# Patient Record
Sex: MALE | Race: WHITE | Employment: OTHER | URBAN - METROPOLITAN AREA
[De-identification: names, ages, dates, MRNs, and addresses within clinical notes are randomized per-mention and may not be internally consistent; named-entity substitution may affect disease eponyms.]

---

## 2017-05-18 RX ORDER — ALBUTEROL SULFATE 90 UG/1
2 AEROSOL, METERED RESPIRATORY (INHALATION) EVERY 6 HOURS PRN
Qty: 1 INHALER | Refills: 1 | Status: SHIPPED | OUTPATIENT
Start: 2017-05-18 | End: 2017-08-29 | Stop reason: SDUPTHER

## 2017-08-30 RX ORDER — ATORVASTATIN CALCIUM 10 MG/1
10 TABLET, FILM COATED ORAL DAILY
Qty: 30 TABLET | Refills: 5 | Status: SHIPPED | OUTPATIENT
Start: 2017-08-30 | End: 2017-09-05

## 2017-08-30 RX ORDER — LOSARTAN POTASSIUM AND HYDROCHLOROTHIAZIDE 12.5; 5 MG/1; MG/1
1 TABLET ORAL DAILY
Qty: 30 TABLET | Refills: 5 | Status: SHIPPED | OUTPATIENT
Start: 2017-08-30 | End: 2017-09-05 | Stop reason: DRUGHIGH

## 2017-08-30 RX ORDER — ALBUTEROL SULFATE 90 UG/1
2 AEROSOL, METERED RESPIRATORY (INHALATION) EVERY 6 HOURS PRN
Qty: 1 INHALER | Refills: 1 | Status: SHIPPED | OUTPATIENT
Start: 2017-08-30 | End: 2017-11-20 | Stop reason: SDUPTHER

## 2017-09-05 ENCOUNTER — OFFICE VISIT (OUTPATIENT)
Dept: FAMILY MEDICINE CLINIC | Age: 42
End: 2017-09-05
Payer: COMMERCIAL

## 2017-09-05 VITALS
RESPIRATION RATE: 16 BRPM | TEMPERATURE: 98.8 F | DIASTOLIC BLOOD PRESSURE: 82 MMHG | WEIGHT: 262 LBS | HEIGHT: 67 IN | HEART RATE: 82 BPM | SYSTOLIC BLOOD PRESSURE: 134 MMHG | BODY MASS INDEX: 41.12 KG/M2

## 2017-09-05 DIAGNOSIS — E78.2 MIXED HYPERLIPIDEMIA: ICD-10-CM

## 2017-09-05 DIAGNOSIS — E88.81 METABOLIC SYNDROME: ICD-10-CM

## 2017-09-05 DIAGNOSIS — Z00.00 ROUTINE HEALTH MAINTENANCE: ICD-10-CM

## 2017-09-05 DIAGNOSIS — Z72.0 TOBACCO USE: ICD-10-CM

## 2017-09-05 DIAGNOSIS — R73.03 PRE-DIABETES: ICD-10-CM

## 2017-09-05 DIAGNOSIS — I10 ESSENTIAL HYPERTENSION: Primary | ICD-10-CM

## 2017-09-05 PROCEDURE — 99214 OFFICE O/P EST MOD 30 MIN: CPT | Performed by: NURSE PRACTITIONER

## 2017-09-05 RX ORDER — LOSARTAN POTASSIUM AND HYDROCHLOROTHIAZIDE 12.5; 1 MG/1; MG/1
1 TABLET ORAL DAILY
Qty: 30 TABLET | Refills: 3 | Status: SHIPPED | OUTPATIENT
Start: 2017-09-05 | End: 2019-08-28

## 2017-09-05 ASSESSMENT — ENCOUNTER SYMPTOMS
SHORTNESS OF BREATH: 0
BLURRED VISION: 0

## 2017-09-13 ASSESSMENT — ENCOUNTER SYMPTOMS
ABDOMINAL DISTENTION: 0
WHEEZING: 0
ABDOMINAL PAIN: 0
RHINORRHEA: 0
EYE PAIN: 0
BLOOD IN STOOL: 0
CONSTIPATION: 0
NAUSEA: 0
PHOTOPHOBIA: 0
CHEST TIGHTNESS: 0
SORE THROAT: 0
VOMITING: 0
COUGH: 0
DIARRHEA: 0
BACK PAIN: 0

## 2017-11-20 RX ORDER — ALBUTEROL SULFATE 90 UG/1
2 AEROSOL, METERED RESPIRATORY (INHALATION) EVERY 6 HOURS PRN
Qty: 18 G | Refills: 0 | Status: SHIPPED | OUTPATIENT
Start: 2017-11-20 | End: 2017-12-29 | Stop reason: SDUPTHER

## 2017-11-20 NOTE — TELEPHONE ENCOUNTER
LOV: 09/05/17  LR: 08/30/17    Health Maintenance   Topic Date Due    HIV screen  11/08/1989    Pneumococcal med risk (1 of 1 - PPSV23) 11/08/1993    Flu vaccine (1) 09/01/2017    Diabetes screen  05/02/2019    Lipid screen  05/02/2021    DTaP/Tdap/Td vaccine (2 - Td) 02/13/2022             (applicable per patient's age: Cancer Screenings, Depression Screening, Fall Risk Screening, Immunizations)    Hemoglobin A1C (%)   Date Value   05/02/2016 5.6     LDL Cholesterol (mg/dL)   Date Value   05/02/2016 141 (H)     AST (U/L)   Date Value   05/02/2016 14     ALT (U/L)   Date Value   05/02/2016 21     BUN (mg/dL)   Date Value   05/02/2016 12      (goal A1C is < 7)   (goal LDL is <100) need 30-50% reduction from baseline     BP Readings from Last 3 Encounters:   09/05/17 134/82   08/01/16 136/84   05/02/16 128/82    (goal /80)      All Future Testing planned in [facility]:  Lab Frequency Next Occurrence   Lipid Panel Once 09/11/2017   Comprehensive Metabolic Panel Once 05/99/6521   CBC Once 09/11/2017   Hemoglobin A1C Once 09/11/2017   Insulin, total Once 09/11/2017       Next Visit Date:  No future appointments.          Patient Active Problem List:     Asthma     Allergic rhinitis     Hypertension     Hyperlipidemia     Metabolic syndrome     Pre-diabetes     Tobacco use

## 2017-12-29 RX ORDER — ALBUTEROL SULFATE 90 UG/1
2 AEROSOL, METERED RESPIRATORY (INHALATION) EVERY 6 HOURS PRN
Qty: 1 INHALER | Refills: 0 | Status: SHIPPED | OUTPATIENT
Start: 2017-12-29 | End: 2019-08-28

## 2017-12-29 NOTE — TELEPHONE ENCOUNTER
LOV 9-5-17  Patients wife states patient or she will call back to schedule as they do not know his schedule at this time. LRF 11-20-17  Health Maintenance   Topic Date Due    HIV screen  11/08/1989    Pneumococcal med risk (1 of 1 - PPSV23) 11/08/1993    Flu vaccine (1) 09/01/2017    Diabetes screen  05/02/2019    Lipid screen  05/02/2021    DTaP/Tdap/Td vaccine (2 - Td) 02/13/2022             (applicable per patient's age: Cancer Screenings, Depression Screening, Fall Risk Screening, Immunizations)    Hemoglobin A1C (%)   Date Value   05/02/2016 5.6     LDL Cholesterol (mg/dL)   Date Value   05/02/2016 141 (H)     AST (U/L)   Date Value   05/02/2016 14     ALT (U/L)   Date Value   05/02/2016 21     BUN (mg/dL)   Date Value   05/02/2016 12      (goal A1C is < 7)   (goal LDL is <100) need 30-50% reduction from baseline     BP Readings from Last 3 Encounters:   09/05/17 134/82   08/01/16 136/84   05/02/16 128/82    (goal /80)      All Future Testing planned in [facility]:  Lab Frequency Next Occurrence   Lipid Panel Once 09/11/2017   Comprehensive Metabolic Panel Once 40/34/4059   CBC Once 09/11/2017   Hemoglobin A1C Once 09/11/2017   Insulin, total Once 09/11/2017       Next Visit Date:  No future appointments.          Patient Active Problem List:     Asthma     Allergic rhinitis     Hypertension     Hyperlipidemia     Metabolic syndrome     Pre-diabetes     Tobacco use

## 2017-12-29 NOTE — TELEPHONE ENCOUNTER
This was just filled last month-does he need a refill? It may be a good idea to discuss a controller medication if he is frequently using his albuterol.

## 2019-08-28 ENCOUNTER — OFFICE VISIT (OUTPATIENT)
Dept: FAMILY MEDICINE CLINIC | Age: 44
End: 2019-08-28
Payer: COMMERCIAL

## 2019-08-28 VITALS
RESPIRATION RATE: 20 BRPM | BODY MASS INDEX: 40.41 KG/M2 | HEART RATE: 86 BPM | DIASTOLIC BLOOD PRESSURE: 90 MMHG | TEMPERATURE: 98 F | SYSTOLIC BLOOD PRESSURE: 158 MMHG | WEIGHT: 258 LBS

## 2019-08-28 DIAGNOSIS — E78.5 HYPERLIPIDEMIA, UNSPECIFIED HYPERLIPIDEMIA TYPE: ICD-10-CM

## 2019-08-28 DIAGNOSIS — R73.01 IMPAIRED FASTING GLUCOSE: ICD-10-CM

## 2019-08-28 DIAGNOSIS — R09.81 NASAL CONGESTION: ICD-10-CM

## 2019-08-28 DIAGNOSIS — I10 ESSENTIAL HYPERTENSION: Primary | ICD-10-CM

## 2019-08-28 DIAGNOSIS — J30.2 SEASONAL ALLERGIES: ICD-10-CM

## 2019-08-28 DIAGNOSIS — J33.9 NASAL POLYPS: ICD-10-CM

## 2019-08-28 DIAGNOSIS — Z72.0 TOBACCO USE: ICD-10-CM

## 2019-08-28 PROCEDURE — 99214 OFFICE O/P EST MOD 30 MIN: CPT | Performed by: PEDIATRICS

## 2019-08-28 RX ORDER — CARVEDILOL PHOSPHATE 40 MG/1
40 CAPSULE, EXTENDED RELEASE ORAL
Qty: 30 CAPSULE | Refills: 3 | Status: SHIPPED | OUTPATIENT
Start: 2019-08-28 | End: 2019-11-20

## 2019-08-28 RX ORDER — AZELASTINE 1 MG/ML
2 SPRAY, METERED NASAL 2 TIMES DAILY
Qty: 4 BOTTLE | Refills: 1 | Status: SHIPPED | OUTPATIENT
Start: 2019-08-28 | End: 2019-11-06

## 2019-08-28 ASSESSMENT — ENCOUNTER SYMPTOMS
COLOR CHANGE: 0
ABDOMINAL PAIN: 0
SHORTNESS OF BREATH: 0
BLURRED VISION: 0
BLOOD IN STOOL: 0
VOMITING: 0
DIARRHEA: 0
RHINORRHEA: 1
PHOTOPHOBIA: 0
EYE REDNESS: 0
EYE DISCHARGE: 0
ORTHOPNEA: 0
EYE PAIN: 0
VOICE CHANGE: 0
STRIDOR: 0
COUGH: 0
WHEEZING: 0
CHEST TIGHTNESS: 0
CONSTIPATION: 0
SORE THROAT: 0

## 2019-08-28 ASSESSMENT — PATIENT HEALTH QUESTIONNAIRE - PHQ9
1. LITTLE INTEREST OR PLEASURE IN DOING THINGS: 0
SUM OF ALL RESPONSES TO PHQ QUESTIONS 1-9: 0
2. FEELING DOWN, DEPRESSED OR HOPELESS: 0
SUM OF ALL RESPONSES TO PHQ QUESTIONS 1-9: 0
SUM OF ALL RESPONSES TO PHQ9 QUESTIONS 1 & 2: 0

## 2019-08-28 NOTE — PROGRESS NOTES
improvement. Compliance problems include exercise. Review of Systems   Constitutional: Negative for appetite change, fatigue, fever and malaise/fatigue. HENT: Positive for congestion, postnasal drip and rhinorrhea. Negative for sore throat, tinnitus and voice change. Anosmia x several months   Eyes: Negative for blurred vision, photophobia, pain, discharge, redness and visual disturbance. Respiratory: Negative for cough, chest tightness, shortness of breath, wheezing and stridor. Cardiovascular: Negative for chest pain, palpitations, orthopnea, leg swelling and PND. Gastrointestinal: Negative for abdominal pain, blood in stool, constipation, diarrhea and vomiting. Endocrine: Negative for polydipsia, polyphagia and polyuria. Genitourinary: Negative for difficulty urinating, frequency, hematuria and urgency. Musculoskeletal: Negative for neck pain. Skin: Negative for color change and rash. Allergic/Immunologic: Negative for immunocompromised state. Neurological: Negative for dizziness, tremors, weakness, light-headedness, numbness and headaches. Hematological: Negative for adenopathy. Does not bruise/bleed easily. Objective:     BP (!) 158/90 (Site: Right Upper Arm, Position: Sitting, Cuff Size: Medium Adult)   Pulse 86   Temp 98 °F (36.7 °C) (Tympanic)   Resp 20   Wt 258 lb (117 kg)   BMI 40.41 kg/m²        Physical Exam   Constitutional: He is oriented to person, place, and time. He appears well-developed and well-nourished. No distress. obese   HENT:   Head: Normocephalic. Right Ear: External ear normal. No middle ear effusion. Left Ear: External ear normal.  No middle ear effusion. Nose: Mucosal edema present. Right sinus exhibits no maxillary sinus tenderness and no frontal sinus tenderness. Left sinus exhibits no maxillary sinus tenderness and no frontal sinus tenderness.    Mouth/Throat: Uvula is midline and mucous membranes are normal. Posterior oropharyngeal erythema (mild with cobblestonning) present. No oropharyngeal exudate or posterior oropharyngeal edema. No tonsillar exudate. Pale and boggy mucosa with bilateral nasal polyps   Eyes: Pupils are equal, round, and reactive to light. Conjunctivae and EOM are normal. Right eye exhibits no discharge. Left eye exhibits no discharge. No scleral icterus. Neck: Normal range of motion. Neck supple. No JVD present. Cardiovascular: Normal rate, regular rhythm and normal heart sounds. No murmur heard. Pulmonary/Chest: Effort normal and breath sounds normal. No respiratory distress. He has no wheezes. He has no rales. Musculoskeletal: He exhibits no edema. Lymphadenopathy:     He has no cervical adenopathy. Neurological: He is alert and oriented to person, place, and time. Skin: Skin is warm. No rash noted. He is not diaphoretic. Psychiatric: He has a normal mood and affect. His speech is normal and behavior is normal. Judgment and thought content normal. His mood appears not anxious. Cognition and memory are normal. He does not exhibit a depressed mood. Nursing note and vitals reviewed. Assessment:      Diagnosis Orders   1. Essential hypertension     2. Hyperlipidemia, unspecified hyperlipidemia type  Lipid Panel    Comprehensive Metabolic Panel    CBC   3. Impaired fasting glucose  Hemoglobin A1C   4. Seasonal allergies  azelastine (ASTELIN) 0.1 % nasal spray    XR SINUSES (MIN 3 VIEWS )    External Referral To ENT   5. Nasal congestion  azelastine (ASTELIN) 0.1 % nasal spray    XR SINUSES (MIN 3 VIEWS )    External Referral To ENT   6. Nasal polyps  External Referral To ENT   7.  Tobacco use         Plan:     Obtain labs as ordered   Start coreg XR 40mg once daily   Discontinue any decongestants  Limit coffee intake and any other caffeine intake  Monitor blood pressure 1-2 times daily strict low-salt, low-cholesterol, low-carb diet  Regular exercise  Weight reduction encouraged  Claritin 10mg once daily   Astelin as prescribed   Obtain sinus xray   ENT consult for evaluation   Smoking cessation encouraged  Call with concerns      Lacy Lopez received counseling on the following healthy behaviors: nutrition, exercise, medication adherence and tobacco cessation  Reviewed prior labs and health maintenance  Continue current medications, diet and exercise. Discussed use, benefit, and side effects of prescribed medications. Barriers to medication compliance addressed. Patient given educational materials - see patient instructions  Was a self-tracking handout given in paper form or via [employer]? No    Requested Prescriptions     Signed Prescriptions Disp Refills    azelastine (ASTELIN) 0.1 % nasal spray 4 Bottle 1     Si sprays by Nasal route 2 times daily Use in each nostril as directed    carvedilol (COREG CR) 40 MG CP24 extended release capsule 30 capsule 3     Sig: Take 1 capsule by mouth daily (with breakfast)       All patient questions answered. Patient voiced understanding. Quality Measures    Body mass index is 40.41 kg/m². Elevated. Weight control planned discussed Healthy diet and regular exercise. BP: (!) 158/90 Blood pressure is high. Treatment plan consists of Weight Reduction, Dietary Sodium Restriction, Increased Physical Activity, Avoid Tobacco and Second-hand Smoke and Antihypertensive Medication Started.     Lab Results   Component Value Date    LDLCHOLESTEROL 141 (H) 2016    (goal LDL reduction with dx if diabetes is 50% LDL reduction)      PHQ Scores 2019   PHQ2 Score 0 0 0   PHQ9 Score 0 0 0     Interpretation of Total Score Depression Severity: 1-4 = Minimal depression, 5-9 = Mild depression, 10-14 = Moderate depression, 15-19 = Moderately severe depression, 20-27 = Severe depression        Electronically signed by Fariba Rainey MD on 2019 at 12:24 PM

## 2019-09-23 ENCOUNTER — HOSPITAL ENCOUNTER (OUTPATIENT)
Age: 44
Setting detail: SPECIMEN
Discharge: HOME | End: 2019-09-23
Payer: COMMERCIAL

## 2019-09-23 DIAGNOSIS — R73.01 IMPAIRED FASTING GLUCOSE: ICD-10-CM

## 2019-09-23 DIAGNOSIS — E78.5 HYPERLIPIDEMIA, UNSPECIFIED HYPERLIPIDEMIA TYPE: ICD-10-CM

## 2019-09-23 LAB
ALBUMIN SERPL-MCNC: 4.3 G/DL (ref 3.5–5.2)
ALBUMIN/GLOBULIN RATIO: 1.6 (ref 1–2.5)
ALP BLD-CCNC: 86 U/L (ref 40–129)
ALT SERPL-CCNC: 12 U/L (ref 5–41)
ANION GAP SERPL CALCULATED.3IONS-SCNC: 12 MMOL/L (ref 9–17)
AST SERPL-CCNC: 13 U/L
BILIRUB SERPL-MCNC: 0.35 MG/DL (ref 0.3–1.2)
BUN BLDV-MCNC: 11 MG/DL (ref 6–20)
BUN/CREAT BLD: ABNORMAL (ref 9–20)
CALCIUM SERPL-MCNC: 9 MG/DL (ref 8.6–10.4)
CHLORIDE BLD-SCNC: 101 MMOL/L (ref 98–107)
CHOLESTEROL/HDL RATIO: 4.8
CHOLESTEROL: 189 MG/DL
CO2: 27 MMOL/L (ref 20–31)
CREAT SERPL-MCNC: 0.87 MG/DL (ref 0.7–1.2)
ESTIMATED AVERAGE GLUCOSE: 117 MG/DL
GFR AFRICAN AMERICAN: >60 ML/MIN
GFR NON-AFRICAN AMERICAN: >60 ML/MIN
GFR SERPL CREATININE-BSD FRML MDRD: ABNORMAL ML/MIN/{1.73_M2}
GFR SERPL CREATININE-BSD FRML MDRD: ABNORMAL ML/MIN/{1.73_M2}
GLUCOSE BLD-MCNC: 114 MG/DL (ref 70–99)
HBA1C MFR BLD: 5.7 % (ref 4–6)
HCT VFR BLD CALC: 48.3 % (ref 40.7–50.3)
HDLC SERPL-MCNC: 39 MG/DL
HEMOGLOBIN: 15.6 G/DL (ref 13–17)
LDL CHOLESTEROL: 119 MG/DL (ref 0–130)
MCH RBC QN AUTO: 29.2 PG (ref 25.2–33.5)
MCHC RBC AUTO-ENTMCNC: 32.3 G/DL (ref 28.4–34.8)
MCV RBC AUTO: 90.3 FL (ref 82.6–102.9)
NRBC AUTOMATED: 0 PER 100 WBC
PDW BLD-RTO: 12.3 % (ref 11.8–14.4)
PLATELET # BLD: 279 K/UL (ref 138–453)
PMV BLD AUTO: 11.2 FL (ref 8.1–13.5)
POTASSIUM SERPL-SCNC: 4.4 MMOL/L (ref 3.7–5.3)
RBC # BLD: 5.35 M/UL (ref 4.21–5.77)
SODIUM BLD-SCNC: 140 MMOL/L (ref 135–144)
TOTAL PROTEIN: 7 G/DL (ref 6.4–8.3)
TRIGL SERPL-MCNC: 156 MG/DL
VLDLC SERPL CALC-MCNC: ABNORMAL MG/DL (ref 1–30)
WBC # BLD: 8.1 K/UL (ref 3.5–11.3)

## 2019-09-24 ENCOUNTER — TELEPHONE (OUTPATIENT)
Dept: FAMILY MEDICINE CLINIC | Age: 44
End: 2019-09-24

## 2019-09-24 DIAGNOSIS — R73.03 PRE-DIABETES: ICD-10-CM

## 2019-09-24 DIAGNOSIS — J01.90 ACUTE SINUSITIS, RECURRENCE NOT SPECIFIED, UNSPECIFIED LOCATION: ICD-10-CM

## 2019-09-24 DIAGNOSIS — J01.90 ACUTE SINUSITIS, RECURRENCE NOT SPECIFIED, UNSPECIFIED LOCATION: Primary | ICD-10-CM

## 2019-09-24 DIAGNOSIS — R73.01 IMPAIRED FASTING GLUCOSE: ICD-10-CM

## 2019-09-24 DIAGNOSIS — E78.2 MIXED HYPERLIPIDEMIA: Primary | ICD-10-CM

## 2019-09-24 RX ORDER — AMOXICILLIN AND CLAVULANATE POTASSIUM 562.5; 437.5; 62.5 MG/1; MG/1; MG/1
1 TABLET, FILM COATED, EXTENDED RELEASE ORAL 2 TIMES DAILY
Qty: 28 TABLET | Refills: 0 | Status: CANCELLED | OUTPATIENT
Start: 2019-09-24 | End: 2019-10-08

## 2019-09-24 RX ORDER — AMOXICILLIN AND CLAVULANATE POTASSIUM 562.5; 437.5; 62.5 MG/1; MG/1; MG/1
1 TABLET, FILM COATED, EXTENDED RELEASE ORAL 2 TIMES DAILY
Qty: 28 TABLET | Refills: 0 | Status: SHIPPED | OUTPATIENT
Start: 2019-09-24 | End: 2019-10-08

## 2019-10-07 DIAGNOSIS — J01.90 ACUTE SINUSITIS, RECURRENCE NOT SPECIFIED, UNSPECIFIED LOCATION: Primary | ICD-10-CM

## 2019-11-06 ENCOUNTER — OFFICE VISIT (OUTPATIENT)
Dept: FAMILY MEDICINE CLINIC | Age: 44
End: 2019-11-06
Payer: COMMERCIAL

## 2019-11-06 ENCOUNTER — HOSPITAL ENCOUNTER (OUTPATIENT)
Age: 44
Setting detail: SPECIMEN
Discharge: HOME | End: 2019-11-06
Payer: COMMERCIAL

## 2019-11-06 VITALS
DIASTOLIC BLOOD PRESSURE: 90 MMHG | BODY MASS INDEX: 40.25 KG/M2 | SYSTOLIC BLOOD PRESSURE: 142 MMHG | TEMPERATURE: 98.6 F | RESPIRATION RATE: 20 BRPM | WEIGHT: 257 LBS

## 2019-11-06 DIAGNOSIS — J02.9 SORE THROAT: ICD-10-CM

## 2019-11-06 DIAGNOSIS — R45.4 IRRITABLE: ICD-10-CM

## 2019-11-06 DIAGNOSIS — J30.2 SEASONAL ALLERGIES: ICD-10-CM

## 2019-11-06 DIAGNOSIS — R09.81 NASAL CONGESTION: ICD-10-CM

## 2019-11-06 DIAGNOSIS — I10 ESSENTIAL HYPERTENSION: Primary | ICD-10-CM

## 2019-11-06 DIAGNOSIS — F06.30 DEPRESSIVE DISORDER DUE TO SEPARATE MEDICAL CONDITION: ICD-10-CM

## 2019-11-06 DIAGNOSIS — F06.4 ANXIETY DISORDER DUE TO MEDICAL CONDITION: ICD-10-CM

## 2019-11-06 DIAGNOSIS — J33.9 NASAL POLYPS: ICD-10-CM

## 2019-11-06 LAB — S PYO AG THROAT QL: NORMAL

## 2019-11-06 PROCEDURE — 99214 OFFICE O/P EST MOD 30 MIN: CPT | Performed by: PEDIATRICS

## 2019-11-06 PROCEDURE — 87880 STREP A ASSAY W/OPTIC: CPT | Performed by: PEDIATRICS

## 2019-11-06 RX ORDER — CARVEDILOL 25 MG/1
25 TABLET ORAL 2 TIMES DAILY
Qty: 60 TABLET | Refills: 5 | Status: SHIPPED | OUTPATIENT
Start: 2019-11-06 | End: 2020-04-13

## 2019-11-06 ASSESSMENT — ENCOUNTER SYMPTOMS
ORTHOPNEA: 0
BLOOD IN STOOL: 0
COLOR CHANGE: 0
CHEST TIGHTNESS: 0
EYE PAIN: 0
BLURRED VISION: 0
DIARRHEA: 0
WHEEZING: 0
SHORTNESS OF BREATH: 0
EYE DISCHARGE: 0
COUGH: 0
EYE REDNESS: 0
VOICE CHANGE: 0
ABDOMINAL PAIN: 0
RHINORRHEA: 1
CONSTIPATION: 0
VOMITING: 0
PHOTOPHOBIA: 0
STRIDOR: 0

## 2019-11-07 LAB
DIRECT EXAM: NORMAL
Lab: NORMAL
SPECIMEN DESCRIPTION: NORMAL

## 2019-11-07 ASSESSMENT — ENCOUNTER SYMPTOMS: SORE THROAT: 1

## 2019-11-20 ENCOUNTER — NURSE ONLY (OUTPATIENT)
Dept: FAMILY MEDICINE CLINIC | Age: 44
End: 2019-11-20

## 2019-11-20 VITALS
DIASTOLIC BLOOD PRESSURE: 86 MMHG | HEART RATE: 90 BPM | TEMPERATURE: 97.9 F | SYSTOLIC BLOOD PRESSURE: 140 MMHG | RESPIRATION RATE: 20 BRPM

## 2019-11-20 DIAGNOSIS — I10 ESSENTIAL HYPERTENSION: Primary | ICD-10-CM

## 2019-11-20 RX ORDER — AMLODIPINE BESYLATE 5 MG/1
5 TABLET ORAL DAILY
Qty: 30 TABLET | Refills: 5 | Status: SHIPPED | OUTPATIENT
Start: 2019-11-20 | End: 2020-05-13

## 2019-12-12 LAB
BUN BLDV-MCNC: 14 MG/DL
CALCIUM SERPL-MCNC: 8.8 MG/DL
CHLORIDE BLD-SCNC: 102 MMOL/L
CO2: 30 MMOL/L
CREAT SERPL-MCNC: 1.04 MG/DL
GFR CALCULATED: 77
GLUCOSE BLD-MCNC: 110 MG/DL
POTASSIUM SERPL-SCNC: 4.2 MMOL/L
SODIUM BLD-SCNC: 139 MMOL/L

## 2020-01-01 RX ORDER — ALBUTEROL SULFATE 90 UG/1
2 AEROSOL, METERED RESPIRATORY (INHALATION) EVERY 4 HOURS PRN
Qty: 1 INHALER | Refills: 1 | Status: SHIPPED | OUTPATIENT
Start: 2020-01-01 | End: 2021-01-01 | Stop reason: SDUPTHER

## 2020-01-16 ENCOUNTER — OFFICE VISIT (OUTPATIENT)
Dept: FAMILY MEDICINE CLINIC | Age: 45
End: 2020-01-16
Payer: COMMERCIAL

## 2020-01-16 VITALS
HEART RATE: 63 BPM | BODY MASS INDEX: 41.65 KG/M2 | RESPIRATION RATE: 20 BRPM | SYSTOLIC BLOOD PRESSURE: 152 MMHG | WEIGHT: 265.9 LBS | TEMPERATURE: 97.8 F | DIASTOLIC BLOOD PRESSURE: 96 MMHG | OXYGEN SATURATION: 96 %

## 2020-01-16 PROCEDURE — 99213 OFFICE O/P EST LOW 20 MIN: CPT | Performed by: NURSE PRACTITIONER

## 2020-01-16 PROCEDURE — 99406 BEHAV CHNG SMOKING 3-10 MIN: CPT | Performed by: NURSE PRACTITIONER

## 2020-01-16 RX ORDER — HYDROCHLOROTHIAZIDE 25 MG/1
25 TABLET ORAL EVERY MORNING
Qty: 90 TABLET | Refills: 1 | Status: SHIPPED | OUTPATIENT
Start: 2020-01-16 | End: 2020-12-02 | Stop reason: SDUPTHER

## 2020-01-16 RX ORDER — AZELASTINE 1 MG/ML
1 SPRAY, METERED NASAL PRN
COMMUNITY
Start: 2020-01-03 | End: 2020-09-25 | Stop reason: SDUPTHER

## 2020-01-16 RX ORDER — FLUTICASONE PROPIONATE 50 MCG
1 SPRAY, SUSPENSION (ML) NASAL PRN
COMMUNITY
Start: 2020-01-03 | End: 2020-09-25

## 2020-01-16 RX ORDER — CETIRIZINE HYDROCHLORIDE 10 MG/1
10 TABLET ORAL DAILY
COMMUNITY
Start: 2020-01-03 | End: 2020-09-25 | Stop reason: SDUPTHER

## 2020-01-16 ASSESSMENT — PATIENT HEALTH QUESTIONNAIRE - PHQ9
6. FEELING BAD ABOUT YOURSELF - OR THAT YOU ARE A FAILURE OR HAVE LET YOURSELF OR YOUR FAMILY DOWN: 0
10. IF YOU CHECKED OFF ANY PROBLEMS, HOW DIFFICULT HAVE THESE PROBLEMS MADE IT FOR YOU TO DO YOUR WORK, TAKE CARE OF THINGS AT HOME, OR GET ALONG WITH OTHER PEOPLE: 1
4. FEELING TIRED OR HAVING LITTLE ENERGY: 1
SUM OF ALL RESPONSES TO PHQ9 QUESTIONS 1 & 2: 4
SUM OF ALL RESPONSES TO PHQ QUESTIONS 1-9: 7
SUM OF ALL RESPONSES TO PHQ QUESTIONS 1-9: 7
8. MOVING OR SPEAKING SO SLOWLY THAT OTHER PEOPLE COULD HAVE NOTICED. OR THE OPPOSITE, BEING SO FIGETY OR RESTLESS THAT YOU HAVE BEEN MOVING AROUND A LOT MORE THAN USUAL: 0
5. POOR APPETITE OR OVEREATING: 1
2. FEELING DOWN, DEPRESSED OR HOPELESS: 1
3. TROUBLE FALLING OR STAYING ASLEEP: 1
9. THOUGHTS THAT YOU WOULD BE BETTER OFF DEAD, OR OF HURTING YOURSELF: 0
1. LITTLE INTEREST OR PLEASURE IN DOING THINGS: 3

## 2020-01-16 ASSESSMENT — ENCOUNTER SYMPTOMS
BLURRED VISION: 0
SORE THROAT: 0
SINUS PAIN: 0
CHEST TIGHTNESS: 0
SINUS PRESSURE: 0
SHORTNESS OF BREATH: 0

## 2020-01-16 NOTE — PROGRESS NOTES
History of Present Illness:     Eli King is a 38 y.o. male who presents in office today with Self follow up on recent condition of high blood pressure. Denies any headaches, dizziness, or chest pain. Around 142/90 checking intermittently. Patient states he has family history of hypertension. Does not miss any medication dosages. He does not feel like current blood pressure medication is working well. He feels like he is struggled to continue medication. Had nasal polyp surgery approx 1 month ago. Hypertension   This is a chronic problem. The problem is unchanged. The problem is uncontrolled. Pertinent negatives include no blurred vision, chest pain, headaches, palpitations or shortness of breath. There are no associated agents to hypertension. Risk factors for coronary artery disease include smoking/tobacco exposure, stress, male gender and obesity. Past treatments include angiotensin blockers and diuretics. The current treatment provides mild improvement. Compliance problems include exercise and diet. Patient Care Team:  Bipin Juarez MD as PCP - General (Pediatrics)  Bipin Juarez MD as PCP - [facility] Empaneled Provider    Visit Information    Have you changed or started any medications since your last visit including any over-the-counter medicines, vitamins, or herbal medicines? yes - Med list updated   Are you having any side effects from any of your medications? -  No  Have you stopped taking any of your medications? Is so, why? -  yes - Med list updated  Have you seen any other physician or provider since your last visit? Yes - Records Obtained  Dr. Alejandro Deutsch allergist, Dr. Daryle Laughter   Have you had any other diagnostic tests since your last visit? Yes - Records Obtained Allergy testing, Blood work   Have you been seen in the emergency room and/or had an admission to a hospital since we last saw you? No  Have you had your routine dental cleaning in the past 6 months?  Yes  Have Physical Exam  Constitutional:       Appearance: Normal appearance. He is obese. HENT:      Head: Normocephalic and atraumatic. Right Ear: External ear normal.      Left Ear: External ear normal.   Neck:      Musculoskeletal: Normal range of motion. Cardiovascular:      Rate and Rhythm: Normal rate and regular rhythm. Pulses: Normal pulses. Heart sounds: Normal heart sounds. Pulmonary:      Effort: Pulmonary effort is normal. No respiratory distress. Breath sounds: Normal breath sounds. No wheezing. Abdominal:      General: Abdomen is flat. Bowel sounds are normal.   Musculoskeletal: Normal range of motion. General: No swelling. Skin:     General: Skin is warm and dry. Neurological:      Mental Status: He is alert and oriented to person, place, and time. Motor: No weakness. Psychiatric:         Mood and Affect: Mood normal.         Thought Content: Thought content normal.         Judgment: Judgment normal.         Diagnoses:      Diagnosis Orders   1. Essential hypertension  hydrochlorothiazide (HYDRODIURIL) 25 MG tablet   2. Tobacco use     3. Class 3 severe obesity due to excess calories without serious comorbidity with body mass index (BMI) of 40.0 to 44.9 in adult [facility])       Plan:     Items for Patient/Writer/Staff to Accomplish   Medications sent and will be available at pharmacy or mail away and Continue with all other medications as prescribed. HCTZ to start. Alert writer in 1-2 weeks of blood pressure. Continue other medications, may stop Norvasc in future. Encouraged healthy diet and exercise. Call office with any new or worsening symptoms or concerns. Alma Armendariz received counseling on the following healthy behaviors: nutrition, exercise, medication adherence and tobacco cessation  Reviewed prior labs and health maintenance  Continue current medications, diet and exercise. Discussed use, benefit, and side effects of prescribed medications. Barriers to medication compliance addressed. Patient given educational materials - see patient instructions  Was a self-tracking handout given in paper form or via [employer]? No:     Requested Prescriptions     Signed Prescriptions Disp Refills    hydrochlorothiazide (HYDRODIURIL) 25 MG tablet 90 tablet 1     Sig: Take 1 tablet by mouth every morning       All patient questions answered. Patient voiced understanding. Quality Measures    Body mass index is 41.65 kg/m². Elevated. Weight control planned discussed daily exercise regimen and Healthy diet and regular exercise. BP: (!) 152/96 Blood pressure is high. Treatment plan consists of Weight Reduction, Dietary Sodium Restriction and Avoid Tobacco and Second-hand Smoke. Lab Results   Component Value Date    LDLCHOLESTEROL 119 09/23/2019    (goal LDL reduction with dx if diabetes is 50% LDL reduction)      PHQ Scores 1/16/2020 8/28/2019 8/1/2016 5/2/2016   PHQ2 Score 4 0 0 0   PHQ9 Score 7 0 0 0     Interpretation of Total Score Depression Severity: 1-4 = Minimal depression, 5-9 = Mild depression, 10-14 = Moderate depression, 15-19 = Moderately severe depression, 20-27 = Severe depression  Return in about 3 months (around 4/16/2020) for HTN. \"There is beauty in all things if you choose to see it\"    Jordan Lopez, MSN, APRN-CNP   [facility]  [email or web address]   Office: [phone]   Cell: [phone]    Electronically signed today by BRY Bae CNP on 1/16/2020 at 5:47 PM

## 2020-04-14 RX ORDER — CARVEDILOL 25 MG/1
25 TABLET ORAL 2 TIMES DAILY
Qty: 60 TABLET | Refills: 5 | Status: SHIPPED | OUTPATIENT
Start: 2020-04-14 | End: 2020-09-25 | Stop reason: SDUPTHER

## 2020-05-13 RX ORDER — AMLODIPINE BESYLATE 5 MG/1
5 TABLET ORAL DAILY
Qty: 30 TABLET | Refills: 5 | Status: SHIPPED | OUTPATIENT
Start: 2020-05-13 | End: 2020-09-25 | Stop reason: SDUPTHER

## 2020-09-25 ENCOUNTER — OFFICE VISIT (OUTPATIENT)
Dept: FAMILY MEDICINE CLINIC | Age: 45
End: 2020-09-25
Payer: COMMERCIAL

## 2020-09-25 VITALS
OXYGEN SATURATION: 94 % | HEART RATE: 73 BPM | RESPIRATION RATE: 20 BRPM | WEIGHT: 279 LBS | TEMPERATURE: 96.6 F | SYSTOLIC BLOOD PRESSURE: 158 MMHG | HEIGHT: 68 IN | DIASTOLIC BLOOD PRESSURE: 100 MMHG | BODY MASS INDEX: 42.28 KG/M2

## 2020-09-25 PROCEDURE — 99214 OFFICE O/P EST MOD 30 MIN: CPT | Performed by: PEDIATRICS

## 2020-09-25 RX ORDER — HYDROCHLOROTHIAZIDE 25 MG/1
25 TABLET ORAL DAILY
Qty: 30 TABLET | Refills: 5 | Status: SHIPPED | OUTPATIENT
Start: 2020-09-25 | End: 2021-01-01

## 2020-09-25 RX ORDER — AZELASTINE 1 MG/ML
2 SPRAY, METERED NASAL 2 TIMES DAILY
Qty: 30 ML | Refills: 2 | Status: SHIPPED | OUTPATIENT
Start: 2020-09-25 | End: 2021-01-01 | Stop reason: SDUPTHER

## 2020-09-25 RX ORDER — CETIRIZINE HYDROCHLORIDE 10 MG/1
10 TABLET ORAL DAILY
Qty: 30 TABLET | Refills: 5 | Status: SHIPPED | OUTPATIENT
Start: 2020-09-25 | End: 2021-01-01

## 2020-09-25 RX ORDER — CARVEDILOL 25 MG/1
25 TABLET ORAL 2 TIMES DAILY
Qty: 60 TABLET | Refills: 5 | Status: SHIPPED | OUTPATIENT
Start: 2020-09-25 | End: 2021-01-01

## 2020-09-25 RX ORDER — HYDROCHLOROTHIAZIDE 25 MG/1
25 TABLET ORAL
COMMUNITY
Start: 2020-01-16 | End: 2020-09-25 | Stop reason: SDUPTHER

## 2020-09-25 RX ORDER — ALBUTEROL SULFATE 90 UG/1
2 AEROSOL, METERED RESPIRATORY (INHALATION) 4 TIMES DAILY PRN
Qty: 1 INHALER | Refills: 1 | Status: SHIPPED | OUTPATIENT
Start: 2020-09-25 | End: 2020-01-01

## 2020-09-25 RX ORDER — AMLODIPINE BESYLATE 5 MG/1
5 TABLET ORAL DAILY
Qty: 30 TABLET | Refills: 5 | Status: SHIPPED | OUTPATIENT
Start: 2020-09-25 | End: 2021-01-01

## 2020-09-25 ASSESSMENT — ENCOUNTER SYMPTOMS
SHORTNESS OF BREATH: 0
CONSTIPATION: 0
SORE THROAT: 0
EYE REDNESS: 0
CHEST TIGHTNESS: 0
EYE DISCHARGE: 0
STRIDOR: 0
VOICE CHANGE: 0
WHEEZING: 0
VOMITING: 0
RHINORRHEA: 0
DIARRHEA: 0
EYE PAIN: 0
COLOR CHANGE: 0
ABDOMINAL PAIN: 0
BLOOD IN STOOL: 0
PHOTOPHOBIA: 0

## 2020-09-25 NOTE — PROGRESS NOTES
Subjective:      Patient ID: Harshil Pino is a 38 y.o. male. Visit Information    Have you changed or started any medications since your last visit including any over-the-counter medicines, vitamins, or herbal medicines? no   Are you having any side effects from any of your medications? -  no  Have you stopped taking any of your medications? Is so, why? -  no    Have you seen any other physician or provider since your last visit? No  Have you had any other diagnostic tests since your last visit? No  Have you been seen in the emergency room and/or had an admission to a hospital since we last saw you? No  Have you had your routine dental cleaning in the past 6 months? no    Have you activated your [employer] account? If not, what are your barriers? No: patient doesn't want.        Patient Care Team:  Kayla Knox MD as PCP - General (Pediatrics)  Kayla Knox MD as PCP - [facility] Provider    Medical History Review  Past Medical, Family, and Social History reviewed and does contribute to the patient presenting condition    Health Maintenance   Topic Date Due    Pneumococcal 0-64 years Vaccine (1 of 1 - PPSV23) 11/08/1980    HIV screen  11/08/1989    Flu vaccine (1) 09/01/2020    A1C test (Diabetic or Prediabetic)  09/23/2020    Potassium monitoring  12/12/2020    Creatinine monitoring  12/12/2020    DTaP/Tdap/Td vaccine (2 - Td) 02/13/2022    Lipid screen  09/23/2024    Hepatitis A vaccine  Aged Out    Hepatitis B vaccine  Aged Out    Hib vaccine  Aged Out    Meningococcal (ACWY) vaccine  Aged Out       [facility] Scores 1/16/2020 8/28/2019 8/1/2016 5/2/2016   PHQ2 Score 4 0 0 0   PHQ9 Score 7 0 0 0     Interpretation of Total Score DepressionSeverity: 1-4 = Minimal depression, 5-9 = Mild depression, 10-14 = Moderate depression, 15-19 = Moderately severe depression, 20-27 = Severe depression    Current Outpatient Medications   Medication Sig Dispense Refill    azelastine (ASTELIN) 0.1 % nasal spray 2 sprays by Nasal route 2 times daily 30 mL 2    cetirizine (ZYRTEC) 10 MG tablet Take 1 tablet by mouth daily 30 tablet 5    hydroCHLOROthiazide (HYDRODIURIL) 25 MG tablet Take 1 tablet by mouth daily 30 tablet 5    carvedilol (COREG) 25 MG tablet Take 1 tablet by mouth 2 times daily 60 tablet 5    amLODIPine (NORVASC) 5 MG tablet Take 1 tablet by mouth daily 30 tablet 5    albuterol sulfate HFA (VENTOLIN HFA) 108 (90 Base) MCG/ACT inhaler Inhale 2 puffs into the lungs 4 times daily as needed for Wheezing 1 Inhaler 1    hydrochlorothiazide (HYDRODIURIL) 25 MG tablet Take 1 tablet by mouth every morning 90 tablet 1    sertraline (ZOLOFT) 50 MG tablet Take 1 tablet by mouth daily (Patient not taking: Reported on 9/25/2020) 30 tablet 5     No current facility-administered medications for this visit. HPI    Patient presents today for routine follow-up of his chronic medical problems. It has been sometime since he has been in. He does have a past medical history significant for hypertension, hyperlipidemia, impaired fasting blood sugar / prediabetes, seasonal allergies, nasal polyps and opacification of the maxillary sinuses. He also has a history of depressive disorder due to medical condition as well as anxiety disorder due to medical condition. He does tell me that he stopped taking his medications before the summer. He states he did this because he was quite busy and he just forgets to take his medications. He states his blood pressures have been horrible and he is here to get back on his medications. He was previously on Coreg 25 mg twice daily, Norvasc once daily and hydrochlorothiazide. He states he has not been exercising and I encouraged him to start. He does have a history of hyperlipidemia that is not treated with medication. He is due for repeat labs to reevaluate this.   He also has a history of an impaired fasting blood sugar and prediabetes (HgA1c 5.7) and is due for repeat labs. He did have significant nasal polyps causing chronic nasal congestion and maxillary sinus opacification. He did have surgery to remove polyps last year and he does report that he is doing better since then. He does continue to use Zyrtec and Astelin regularly. He did have depressive symptoms and anxiety symptoms secondary to these nasal polyps and chronic congestion. He states that overall this is doing much better since his surgery. He does continue to smoke cigarettes and was encouraged to discontinue. He does have a chronic cough secondary to simple chronic bronchitis and asks for refill of an albuterol inhaler. cmw        Review of Systems   Constitutional: Positive for unexpected weight change (has gained weight). Negative for appetite change, fatigue and fever. HENT: Positive for congestion. Negative for postnasal drip, rhinorrhea, sore throat, tinnitus and voice change. Left nostril congestion still but much better / right nostril is great   Eyes: Negative for photophobia, pain, discharge, redness and visual disturbance. Respiratory: Positive for cough (occasional secondary to smoking). Negative for chest tightness, shortness of breath, wheezing and stridor. Cardiovascular: Negative for chest pain, palpitations and leg swelling. Gastrointestinal: Negative for abdominal pain, blood in stool, constipation, diarrhea and vomiting. Endocrine: Negative for polydipsia, polyphagia and polyuria. Genitourinary: Negative for difficulty urinating, frequency, hematuria and urgency. Musculoskeletal: Negative for arthralgias, myalgias and neck pain. Skin: Negative for color change and rash. Allergic/Immunologic: Negative for immunocompromised state. Neurological: Negative for dizziness, tremors, weakness, light-headedness, numbness and headaches. Hematological: Negative for adenopathy. Does not bruise/bleed easily.    Psychiatric/Behavioral: Negative for agitation and cough occasionally  Abdominal:      General: Bowel sounds are normal.      Tenderness: There is no right CVA tenderness or left CVA tenderness. Musculoskeletal:      Right lower leg: No edema. Left lower leg: No edema. Lymphadenopathy:      Cervical: No cervical adenopathy. Skin:     General: Skin is warm. Capillary Refill: Capillary refill takes less than 2 seconds. Findings: No rash. Neurological:      General: No focal deficit present. Mental Status: He is alert and oriented to person, place, and time. Psychiatric:         Mood and Affect: Mood and affect normal. Mood is not anxious or depressed. Speech: Speech normal.         Behavior: Behavior normal.         Thought Content: Thought content normal.         Judgment: Judgment normal.         Assessment:      Diagnosis Orders   1. Essential hypertension  hydroCHLOROthiazide (HYDRODIURIL) 25 MG tablet    carvedilol (COREG) 25 MG tablet    amLODIPine (NORVASC) 5 MG tablet    Comprehensive Metabolic Panel    CBC   2. Hyperlipidemia, unspecified hyperlipidemia type  Lipid Panel    Comprehensive Metabolic Panel   3. Impaired fasting glucose  Hemoglobin A1C   4. Pre-diabetes  Hemoglobin A1C   5. Seasonal allergies  azelastine (ASTELIN) 0.1 % nasal spray    cetirizine (ZYRTEC) 10 MG tablet   6. Nasal congestion     7. History of nasal polyp     8. 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 Anxiety disorder due to medical condition     10. Tobacco use     11.  Simple chronic bronchitis (HCC)  albuterol sulfate HFA (VENTOLIN HFA) 108 (90 Base) MCG/ACT inhaler       Plan:     Proceed with obtain labs as ordered  Restart medications as prescribed  Monitor blood pressure once daily  Strict low carbohydrate, low-cholesterol, low-salt diet recommended  Daily exercise encouraged  Weight reduction encouraged  Follow-up with ENT and to continue antihistamine and Astelin nasal spray  Smoking cessation encouraged  Refill albuterol to use as needed  Call with concerns        Mya Alonzo received counseling on the following healthy behaviors: nutrition, exercise, medication adherence and tobacco cessation  Reviewed prior labs and health maintenance  Continue current medications, diet and exercise. Discussed use, benefit, and side effects of prescribed medications. Barriers to medication compliance addressed. Patient given educational materials - see patient instructions  Was a self-tracking handout given in paper form or via [employer]? No    Requested Prescriptions     Signed Prescriptions Disp Refills    azelastine (ASTELIN) 0.1 % nasal spray 30 mL 2     Si sprays by Nasal route 2 times daily    cetirizine (ZYRTEC) 10 MG tablet 30 tablet 5     Sig: Take 1 tablet by mouth daily    hydroCHLOROthiazide (HYDRODIURIL) 25 MG tablet 30 tablet 5     Sig: Take 1 tablet by mouth daily    carvedilol (COREG) 25 MG tablet 60 tablet 5     Sig: Take 1 tablet by mouth 2 times daily    amLODIPine (NORVASC) 5 MG tablet 30 tablet 5     Sig: Take 1 tablet by mouth daily    albuterol sulfate HFA (VENTOLIN HFA) 108 (90 Base) MCG/ACT inhaler 1 Inhaler 1     Sig: Inhale 2 puffs into the lungs 4 times daily as needed for Wheezing       All patient questions answered. Patient voiced understanding. Quality Measures    Body mass index is 42.42 kg/m². Elevated. Weight control planned discussed Healthy diet and regular exercise. BP: (!) 158/100 Blood pressure is very high. Treatment plan consists of Weight Reduction, DASH Eating Plan, Dietary Sodium Restriction, Increased Physical Activity, Avoid Tobacco and Second-hand Smoke and Antihypertensive Medication Started.     Lab Results   Component Value Date    LDLCHOLESTEROL 119 2019    (goal LDL reduction with dx if diabetes is 50% LDL reduction)      PHQ Scores 2020   PHQ2 Score 4 0 0 0   PHQ9 Score 7 0 0 0     Interpretation of Total Score Depression Severity: 1-4 = Minimal depression, 5-9 = Mild depression, 10-14 = Moderate depression, 15-19 = Moderately severe depression, 20-27 = Severe depression          Electronically signed by Christos Cruz MD on 9/29/2020 at 5:36 PM

## 2020-09-29 ASSESSMENT — ENCOUNTER SYMPTOMS: COUGH: 1

## 2020-12-02 ENCOUNTER — OFFICE VISIT (OUTPATIENT)
Dept: FAMILY MEDICINE CLINIC | Age: 45
End: 2020-12-02
Payer: COMMERCIAL

## 2020-12-02 VITALS
WEIGHT: 292 LBS | DIASTOLIC BLOOD PRESSURE: 86 MMHG | HEART RATE: 72 BPM | RESPIRATION RATE: 20 BRPM | BODY MASS INDEX: 44.25 KG/M2 | TEMPERATURE: 98.5 F | SYSTOLIC BLOOD PRESSURE: 128 MMHG | HEIGHT: 68 IN

## 2020-12-02 LAB
ALCOHOL URINE: NEGATIVE
AMPHETAMINE SCREEN, URINE: NEGATIVE
BARBITURATE SCREEN, URINE: NEGATIVE
BENZODIAZEPINE SCREEN, URINE: NEGATIVE
BUPRENORPHINE URINE: NEGATIVE
COCAINE METABOLITE SCREEN URINE: NEGATIVE
FENTANYL SCREEN, URINE: NEGATIVE
GABAPENTIN SCREEN, URINE: NEGATIVE
MDMA URINE: NEGATIVE
METHADONE SCREEN, URINE: NEGATIVE
METHAMPHETAMINE, URINE: NEGATIVE
OPIATE SCREEN URINE: NEGATIVE
OXYCODONE SCREEN URINE: NEGATIVE
PHENCYCLIDINE SCREEN URINE: NEGATIVE
PROPOXYPHENE SCREEN, URINE: NEGATIVE
SYNTHETIC CANNABINOIDS(K2) SCREEN, URINE: NEGATIVE
THC SCREEN, URINE: NEGATIVE
TRAMADOL SCREEN URINE: NEGATIVE
TRICYCLIC ANTIDEPRESSANTS, UR: NEGATIVE

## 2020-12-02 PROCEDURE — 99214 OFFICE O/P EST MOD 30 MIN: CPT | Performed by: PEDIATRICS

## 2020-12-02 PROCEDURE — 80305 DRUG TEST PRSMV DIR OPT OBS: CPT | Performed by: PEDIATRICS

## 2020-12-02 ASSESSMENT — ENCOUNTER SYMPTOMS
DIARRHEA: 0
VOMITING: 0
EYE REDNESS: 0
CONSTIPATION: 0
BLOOD IN STOOL: 0
ABDOMINAL PAIN: 0
COUGH: 1
VOICE CHANGE: 0
SORE THROAT: 0
EYE DISCHARGE: 0
RHINORRHEA: 0
EYE PAIN: 0
PHOTOPHOBIA: 0
SHORTNESS OF BREATH: 0
WHEEZING: 0
COLOR CHANGE: 0
STRIDOR: 0
CHEST TIGHTNESS: 0

## 2020-12-02 NOTE — PROGRESS NOTES
Subjective:      Patient ID: Akira Barros is a 54 y.o. male. Visit Information    Have you changed or started any medications since your last visit including any over-the-counter medicines, vitamins, or herbal medicines? no   Are you having any side effects from any of your medications? -  no  Have you stopped taking any of your medications? Is so, why? -  no    Have you seen any other physician or provider since your last visit? No  Have you had any other diagnostic tests since your last visit? No  Have you been seen in the emergency room and/or had an admission to a hospital since we last saw you? No  Have you had your routine dental cleaning in the past 6 months? no    Have you activated your [employer] account? If not, what are your barriers?  No:      Patient Care Team:  Marvin Skinner MD as PCP - General (Pediatrics)  Marvin Skinner MD as PCP - [facility]    Medical History Review  Past Medical, Family, and Social History reviewed and does contribute to the patient presenting condition    Health Maintenance   Topic Date Due    HIV screen  11/08/1989    A1C test (Diabetic or Prediabetic)  09/23/2020    Potassium monitoring  12/12/2020    Creatinine monitoring  12/12/2020    Flu vaccine (1) 12/02/2021 (Originally 9/1/2020)    Pneumococcal 0-64 years Vaccine (1 of 1 - PPSV23) 12/02/2021 (Originally 11/8/1980)    DTaP/Tdap/Td vaccine (2 - Td) 02/13/2022    Lipid screen  09/23/2024    Hepatitis A vaccine  Aged Out    Hepatitis B vaccine  Aged Out    Hib vaccine  Aged Out    Meningococcal (ACWY) vaccine  Aged Out       [facility] Scores 1/16/2020 8/28/2019 8/1/2016 5/2/2016   PHQ2 Score 4 0 0 0   PHQ9 Score 7 0 0 0     Interpretation of Total Score DepressionSeverity: 1-4 = Minimal depression, 5-9 = Mild depression, 10-14 = Moderate depression, 15-19 = Moderately severe depression, 20-27 = Severe depression    Current Outpatient Medications   Medication Sig Dispense Refill    does have a history of significant nasal polyps which caused chronic nasal congestion and maxillary sinus opacification. He did have surgery to remove these polyps last year and does report that he has been better. He does continue to use Zyrtec and Astelin regularly. The left side of his nose does seem to be congested and he does feel as though the polyps have grown back. His right nostril is great. He does have a history of depression and anxiety from the significant symptoms he had from his nasal polyps but this has improved since surgery. Interestingly he does tell me that he was treated for ADHD as a kid. He states that he was tested through what sounds like psychology and actually tested with a very high IQ but was just not able to stay focused on things for very long. He states that he is always functioned well because he is adjusted to his ADHD. He does question treatment for adults. We had a long discussion about stimulants and other medications for ADHD. This then brought us into a conversation about possible binge eating disorder at my prompting. He does admit to persistent episodes of binge eating associated with eating much more rapidly than normal, eating until uncomfortably full and feeling disgusted with himself or guilty after eating a large amount of food. He denies any binging and purging. After he answered these questions I did tell him that he meets DSM-V criteria for binge eating disorder. In light of his history of ADHD and has been to eating disorder symptoms we talked about Vyvanse. I did tell him that this is a touchy situation because of his high blood pressure but I would be willing to start a low-dose of Vyvanse to see if this would help his symptoms yet not elevate his blood pressure. He is willing to try this. He still does continue to smoke cigarettes and was encouraged to discontinue.   cmw          Review of Systems   Constitutional: Positive for unexpected weight change (has gained weight). Negative for appetite change, fatigue and fever. HENT: Positive for congestion. Negative for postnasal drip, rhinorrhea, sore throat, tinnitus and voice change. Left nostril congestion still but much better / right nostril is great   Eyes: Negative for photophobia, pain, discharge, redness and visual disturbance. Respiratory: Positive for cough (occasional secondary to smoking). Negative for chest tightness, shortness of breath, wheezing and stridor. Cardiovascular: Negative for chest pain, palpitations and leg swelling. Gastrointestinal: Negative for abdominal pain, blood in stool, constipation, diarrhea and vomiting. Endocrine: Negative for polydipsia, polyphagia and polyuria. Genitourinary: Negative for difficulty urinating, frequency, hematuria and urgency. Musculoskeletal: Negative for arthralgias, myalgias and neck pain. Skin: Negative for color change and rash. Allergic/Immunologic: Negative for immunocompromised state. Neurological: Negative for dizziness, tremors, weakness, light-headedness, numbness and headaches. Hematological: Negative for adenopathy. Does not bruise/bleed easily. Psychiatric/Behavioral: Positive for decreased concentration. Negative for agitation, dysphoric mood and sleep disturbance. The patient is hyperactive. The patient is not nervous/anxious. Objective:     /86 (Site: Right Upper Arm, Position: Sitting, Cuff Size: Large Adult)   Pulse 72   Temp 98.5 °F (36.9 °C) (Temporal)   Resp 20   Ht 5' 8\" (1.727 m)   Wt 292 lb (132.5 kg)   BMI 44.40 kg/m²        Physical Exam  Vitals signs and nursing note reviewed. Constitutional:       General: He is not in acute distress. Appearance: Normal appearance. He is well-developed. He is obese. He is not ill-appearing, toxic-appearing or diaphoretic. HENT:      Head: Normocephalic.       Right Ear: Tympanic membrane, ear canal and external ear normal. No middle ear effusion. There is no impacted cerumen. Left Ear: Tympanic membrane, ear canal and external ear normal.  No middle ear effusion. There is no impacted cerumen. Nose: Mucosal edema present. No congestion or rhinorrhea. Right Sinus: No maxillary sinus tenderness or frontal sinus tenderness. Left Sinus: No maxillary sinus tenderness or frontal sinus tenderness. Mouth/Throat:      Mouth: Mucous membranes are moist.      Pharynx: Uvula midline. Posterior oropharyngeal erythema (mild with cobblestonning) present. No oropharyngeal exudate. Tonsils: No tonsillar exudate. Eyes:      General: No scleral icterus. Right eye: No discharge. Left eye: No discharge. Extraocular Movements: Extraocular movements intact. Conjunctiva/sclera: Conjunctivae normal.      Pupils: Pupils are equal, round, and reactive to light. Neck:      Musculoskeletal: Normal range of motion and neck supple. Vascular: No JVD. Cardiovascular:      Rate and Rhythm: Normal rate and regular rhythm. Pulses: Normal pulses. Heart sounds: Normal heart sounds. No murmur. Pulmonary:      Effort: Pulmonary effort is normal. No respiratory distress. Breath sounds: Normal breath sounds. No wheezing or rales. Chest:      Comments: hacky cough occasionally  Abdominal:      General: Bowel sounds are normal.      Tenderness: There is no right CVA tenderness or left CVA tenderness. Musculoskeletal:      Right lower leg: No edema. Left lower leg: No edema. Lymphadenopathy:      Cervical: No cervical adenopathy. Skin:     General: Skin is warm. Capillary Refill: Capillary refill takes less than 2 seconds. Findings: No rash. Neurological:      General: No focal deficit present. Mental Status: He is alert and oriented to person, place, and time. Psychiatric:         Mood and Affect: Mood and affect normal. Mood is not anxious or depressed.          Speech: Speech normal.         Behavior: Behavior normal.         Thought Content: Thought content normal.         Cognition and Memory: Cognition normal.         Judgment: Judgment normal.      Comments: He does seem to be a bit hyperactive in conversation with frequent changing of subjects. He can be impulsive with some things he says at times. Assessment:      Diagnosis Orders   1. Essential hypertension     2. Hyperlipidemia, unspecified hyperlipidemia type     3. Impaired fasting glucose     4. Pre-diabetes     5. Seasonal allergies     6. Nasal congestion     7. History of nasal polyp     8. 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 Anxiety disorder due to medical condition     10. Adult ADHD  Lisdexamfetamine Dimesylate (VYVANSE) 20 MG CAPS   11. Binge eating disorder  Lisdexamfetamine Dimesylate (VYVANSE) 20 MG CAPS   12. Therapeutic drug monitoring  POCT Rapid Drug Screen   13. Tobacco use            Plan:     Proceed with continue current medications  Obtain labs as previously ordered  Strict low-salt, low-cholesterol, low carbohydrate diet  Regular exercise encouraged  Weight loss encouraged  Follow-up with ENT for left-sided nasal congestion/nasal polyp  Continue to monitor mood for depression or anxiety   Obtain rapid drug screen - negative   Start Vyvanse as prescribed for ADHD and binge eating disorder  Monitor blood pressure daily   Smoking cessation encouraged  Call with concerns      Tayla Wagner received counseling on the following healthy behaviors: nutrition, exercise, medication adherence and tobacco cessation  Reviewed prior labs and health maintenance  Continue current medications, diet and exercise. Discussed use, benefit, and side effects of prescribed medications. Barriers to medication compliance addressed. Patient given educational materials - see patient instructions  Was a self-tracking handout given in paper form or via [employer]?  No    Requested Prescriptions Signed Prescriptions Disp Refills    Lisdexamfetamine Dimesylate (VYVANSE) 20 MG CAPS 30 capsule 0     Sig: Take 1 capsule by mouth daily for 30 days. All patient questions answered. Patient voiced understanding. Quality Measures    Body mass index is 44.4 kg/m². Elevated. Weight control planned discussed Healthy diet and regular exercise. BP: 128/86 Blood pressure is normal. Treatment plan consists of Weight Reduction, DASH Eating Plan, Dietary Sodium Restriction, Increased Physical Activity and Avoid Tobacco and Second-hand Smoke.     Lab Results   Component Value Date    LDLCHOLESTEROL 119 09/23/2019    (goal LDL reduction with dx if diabetes is 50% LDL reduction)      PHQ Scores 1/16/2020 8/28/2019 8/1/2016 5/2/2016   PHQ2 Score 4 0 0 0   PHQ9 Score 7 0 0 0     Interpretation of Total Score Depression Severity: 1-4 = Minimal depression, 5-9 = Mild depression, 10-14 = Moderate depression, 15-19 = Moderately severe depression, 20-27 = Severe depression      Electronically signed by Mendoza Justin MD on 12/6/2020 at 7:59 PM

## 2020-12-11 ENCOUNTER — TELEPHONE (OUTPATIENT)
Dept: FAMILY MEDICINE CLINIC | Age: 45
End: 2020-12-11

## 2020-12-11 NOTE — TELEPHONE ENCOUNTER
I had already written a note on this denial.  There should be a PA done if it hasn't. The patient has both ADHD and Binge eating disorder. vyvanse is the only medication approved for BED. This should get this medication covered for the patient.

## 2020-12-28 NOTE — TELEPHONE ENCOUNTER
LOV 12-2-20  LRF 9-25-20    Health Maintenance   Topic Date Due    Hepatitis C screen  1975    HIV screen  11/08/1989    A1C test (Diabetic or Prediabetic)  09/23/2020    Potassium monitoring  12/12/2020    Creatinine monitoring  12/12/2020    Flu vaccine (1) 12/02/2021 (Originally 9/1/2020)    Pneumococcal 0-64 years Vaccine (1 of 1 - PPSV23) 12/02/2021 (Originally 11/8/1980)    DTaP/Tdap/Td vaccine (2 - Td) 02/13/2022    Lipid screen  09/23/2024    Hepatitis A vaccine  Aged Out    Hepatitis B vaccine  Aged Out    Hib vaccine  Aged Out    Meningococcal (ACWY) vaccine  Aged Out             (applicable per patient's age: Cancer Screenings, Depression Screening, Fall Risk Screening, Immunizations)    Hemoglobin A1C (%)   Date Value   09/23/2019 5.7   05/02/2016 5.6     LDL Cholesterol (mg/dL)   Date Value   09/23/2019 119     AST (U/L)   Date Value   09/23/2019 13     ALT (U/L)   Date Value   09/23/2019 12     BUN (mg/dL)   Date Value   12/12/2019 14      (goal A1C is < 7)   (goal LDL is <100) need 30-50% reduction from baseline     BP Readings from Last 3 Encounters:   12/02/20 128/86   09/25/20 (!) 158/100   01/16/20 (!) 152/96    (goal /80)      All Future Testing planned in [facility]:  Lab Frequency Next Occurrence       Next Visit Date:  Future Appointments   Date Time Provider [place]   1/4/2021 10:40 AM MD Jerry Medina            Patient Active Problem List:     Asthma     Allergic rhinitis     Hypertension     Hyperlipidemia     Metabolic syndrome     Pre-diabetes     Tobacco use

## 2021-01-01 ENCOUNTER — OFFICE VISIT (OUTPATIENT)
Dept: FAMILY MEDICINE CLINIC | Age: 46
End: 2021-01-01
Payer: COMMERCIAL

## 2021-01-01 ENCOUNTER — TELEPHONE (OUTPATIENT)
Dept: FAMILY MEDICINE CLINIC | Age: 46
End: 2021-01-01

## 2021-01-01 ENCOUNTER — HOSPITAL ENCOUNTER (OUTPATIENT)
Age: 46
Setting detail: SPECIMEN
Discharge: HOME | End: 2021-09-10
Payer: COMMERCIAL

## 2021-01-01 VITALS
SYSTOLIC BLOOD PRESSURE: 124 MMHG | BODY MASS INDEX: 43.33 KG/M2 | DIASTOLIC BLOOD PRESSURE: 72 MMHG | RESPIRATION RATE: 18 BRPM | HEART RATE: 68 BPM | TEMPERATURE: 97.3 F | WEIGHT: 285 LBS

## 2021-01-01 VITALS
BODY MASS INDEX: 43.64 KG/M2 | WEIGHT: 287 LBS | OXYGEN SATURATION: 98 % | TEMPERATURE: 97.9 F | HEART RATE: 71 BPM | DIASTOLIC BLOOD PRESSURE: 86 MMHG | SYSTOLIC BLOOD PRESSURE: 110 MMHG

## 2021-01-01 VITALS
RESPIRATION RATE: 18 BRPM | HEART RATE: 75 BPM | SYSTOLIC BLOOD PRESSURE: 124 MMHG | BODY MASS INDEX: 43.73 KG/M2 | DIASTOLIC BLOOD PRESSURE: 84 MMHG | TEMPERATURE: 97.4 F | WEIGHT: 287.6 LBS

## 2021-01-01 VITALS
BODY MASS INDEX: 40.45 KG/M2 | HEART RATE: 90 BPM | TEMPERATURE: 97.6 F | DIASTOLIC BLOOD PRESSURE: 80 MMHG | RESPIRATION RATE: 16 BRPM | WEIGHT: 266 LBS | SYSTOLIC BLOOD PRESSURE: 120 MMHG

## 2021-01-01 DIAGNOSIS — E78.5 HYPERLIPIDEMIA, UNSPECIFIED HYPERLIPIDEMIA TYPE: ICD-10-CM

## 2021-01-01 DIAGNOSIS — G47.9 SLEEP DISTURBANCE: ICD-10-CM

## 2021-01-01 DIAGNOSIS — F90.9 ADULT ADHD: ICD-10-CM

## 2021-01-01 DIAGNOSIS — R39.198 DIFFICULTY URINATING: ICD-10-CM

## 2021-01-01 DIAGNOSIS — J41.0 SIMPLE CHRONIC BRONCHITIS (HCC): ICD-10-CM

## 2021-01-01 DIAGNOSIS — I10 ESSENTIAL HYPERTENSION: Primary | ICD-10-CM

## 2021-01-01 DIAGNOSIS — R73.01 IMPAIRED FASTING GLUCOSE: ICD-10-CM

## 2021-01-01 DIAGNOSIS — J30.2 SEASONAL ALLERGIES: ICD-10-CM

## 2021-01-01 DIAGNOSIS — I10 ESSENTIAL HYPERTENSION: ICD-10-CM

## 2021-01-01 DIAGNOSIS — R73.03 PRE-DIABETES: ICD-10-CM

## 2021-01-01 DIAGNOSIS — F50.81 BINGE EATING DISORDER: ICD-10-CM

## 2021-01-01 DIAGNOSIS — Z72.0 TOBACCO USE: ICD-10-CM

## 2021-01-01 DIAGNOSIS — F06.30 DEPRESSIVE DISORDER DUE TO SEPARATE MEDICAL CONDITION: ICD-10-CM

## 2021-01-01 DIAGNOSIS — F90.9 ADULT ADHD: Primary | ICD-10-CM

## 2021-01-01 DIAGNOSIS — Z87.09 HISTORY OF NASAL POLYP: ICD-10-CM

## 2021-01-01 DIAGNOSIS — E78.5 HYPERLIPIDEMIA, UNSPECIFIED HYPERLIPIDEMIA TYPE: Primary | ICD-10-CM

## 2021-01-01 DIAGNOSIS — F06.4 ANXIETY DISORDER DUE TO MEDICAL CONDITION: ICD-10-CM

## 2021-01-01 DIAGNOSIS — R35.0 URINARY FREQUENCY: ICD-10-CM

## 2021-01-01 DIAGNOSIS — R39.15 URGENCY OF URINATION: ICD-10-CM

## 2021-01-01 DIAGNOSIS — R09.81 NASAL CONGESTION: ICD-10-CM

## 2021-01-01 DIAGNOSIS — R35.0 URINARY FREQUENCY: Primary | ICD-10-CM

## 2021-01-01 LAB
ALBUMIN SERPL-MCNC: 4.2 G/DL (ref 3.5–5.2)
ALBUMIN/GLOBULIN RATIO: 1.6 (ref 1–2.5)
ALP BLD-CCNC: 89 U/L (ref 40–129)
ALT SERPL-CCNC: 13 U/L (ref 5–41)
ANION GAP SERPL CALCULATED.3IONS-SCNC: 16 MMOL/L (ref 9–17)
AST SERPL-CCNC: 12 U/L
BILIRUB SERPL-MCNC: 0.37 MG/DL (ref 0.3–1.2)
BILIRUBIN URINE: NEGATIVE
BUN BLDV-MCNC: 13 MG/DL (ref 6–20)
BUN/CREAT BLD: ABNORMAL (ref 9–20)
CALCIUM SERPL-MCNC: 9.2 MG/DL (ref 8.6–10.4)
CHLORIDE BLD-SCNC: 101 MMOL/L (ref 98–107)
CHOLESTEROL/HDL RATIO: 5.8
CHOLESTEROL: 227 MG/DL
CO2: 23 MMOL/L (ref 20–31)
COLOR: YELLOW
COMMENT UA: NORMAL
CREAT SERPL-MCNC: 0.93 MG/DL (ref 0.7–1.2)
ESTIMATED AVERAGE GLUCOSE: 123 MG/DL
GFR AFRICAN AMERICAN: >60 ML/MIN
GFR NON-AFRICAN AMERICAN: >60 ML/MIN
GFR SERPL CREATININE-BSD FRML MDRD: ABNORMAL ML/MIN/{1.73_M2}
GFR SERPL CREATININE-BSD FRML MDRD: ABNORMAL ML/MIN/{1.73_M2}
GLUCOSE BLD-MCNC: 120 MG/DL (ref 70–99)
GLUCOSE URINE: NEGATIVE
HBA1C MFR BLD: 5.9 % (ref 4–6)
HCT VFR BLD CALC: 51 % (ref 40.7–50.3)
HDLC SERPL-MCNC: 39 MG/DL
HEMOGLOBIN: 16.1 G/DL (ref 13–17)
KETONES, URINE: NEGATIVE
LDL CHOLESTEROL: 154 MG/DL (ref 0–130)
LEUKOCYTE ESTERASE, URINE: NEGATIVE
MCH RBC QN AUTO: 28.8 PG (ref 25.2–33.5)
MCHC RBC AUTO-ENTMCNC: 31.6 G/DL (ref 28.4–34.8)
MCV RBC AUTO: 91.1 FL (ref 82.6–102.9)
NITRITE, URINE: NEGATIVE
NRBC AUTOMATED: 0 PER 100 WBC
PDW BLD-RTO: 12.8 % (ref 11.8–14.4)
PH UA: 5.5 (ref 5–8)
PLATELET # BLD: 317 K/UL (ref 138–453)
PMV BLD AUTO: 11 FL (ref 8.1–13.5)
POTASSIUM SERPL-SCNC: 4.2 MMOL/L (ref 3.7–5.3)
PROSTATE SPECIFIC ANTIGEN: 0.47 UG/L
PROTEIN UA: NEGATIVE
RBC # BLD: 5.6 M/UL (ref 4.21–5.77)
SODIUM BLD-SCNC: 140 MMOL/L (ref 135–144)
SPECIFIC GRAVITY UA: 1.02 (ref 1–1.03)
TOTAL PROTEIN: 6.9 G/DL (ref 6.4–8.3)
TRIGL SERPL-MCNC: 171 MG/DL
TURBIDITY: CLEAR
URINE HGB: NEGATIVE
UROBILINOGEN, URINE: NORMAL
VLDLC SERPL CALC-MCNC: ABNORMAL MG/DL (ref 1–30)
WBC # BLD: 8.2 K/UL (ref 3.5–11.3)

## 2021-01-01 PROCEDURE — 99214 OFFICE O/P EST MOD 30 MIN: CPT | Performed by: PEDIATRICS

## 2021-01-01 RX ORDER — DEXTROAMPHETAMINE SACCHARATE, AMPHETAMINE ASPARTATE, DEXTROAMPHETAMINE SULFATE AND AMPHETAMINE SULFATE 5; 5; 5; 5 MG/1; MG/1; MG/1; MG/1
20 TABLET ORAL 2 TIMES DAILY
Qty: 60 TABLET | Refills: 0 | Status: SHIPPED | OUTPATIENT
Start: 2021-01-01 | End: 2021-01-01 | Stop reason: SDUPTHER

## 2021-01-01 RX ORDER — DEXTROAMPHETAMINE SACCHARATE, AMPHETAMINE ASPARTATE MONOHYDRATE, DEXTROAMPHETAMINE SULFATE AND AMPHETAMINE SULFATE 3.75; 3.75; 3.75; 3.75 MG/1; MG/1; MG/1; MG/1
15 CAPSULE, EXTENDED RELEASE ORAL DAILY
Qty: 30 CAPSULE | Refills: 0 | Status: SHIPPED | OUTPATIENT
Start: 2021-01-01 | End: 2021-01-01 | Stop reason: SDUPTHER

## 2021-01-01 RX ORDER — ALBUTEROL SULFATE 90 UG/1
2 AEROSOL, METERED RESPIRATORY (INHALATION) EVERY 4 HOURS PRN
Qty: 1 INHALER | Refills: 1 | Status: SHIPPED | OUTPATIENT
Start: 2021-01-01 | End: 2021-01-01 | Stop reason: SDUPTHER

## 2021-01-01 RX ORDER — DEXTROAMPHETAMINE SACCHARATE, AMPHETAMINE ASPARTATE MONOHYDRATE, DEXTROAMPHETAMINE SULFATE AND AMPHETAMINE SULFATE 3.75; 3.75; 3.75; 3.75 MG/1; MG/1; MG/1; MG/1
15 CAPSULE, EXTENDED RELEASE ORAL DAILY
Qty: 30 CAPSULE | Refills: 0 | Status: SHIPPED
Start: 2021-01-01 | End: 2021-01-01 | Stop reason: DRUGHIGH

## 2021-01-01 RX ORDER — AZELASTINE 1 MG/ML
2 SPRAY, METERED NASAL 2 TIMES DAILY
Qty: 30 ML | Refills: 5 | Status: SHIPPED | OUTPATIENT
Start: 2021-01-01 | End: 2022-03-07

## 2021-01-01 RX ORDER — DEXTROAMPHETAMINE SACCHARATE, AMPHETAMINE ASPARTATE, DEXTROAMPHETAMINE SULFATE AND AMPHETAMINE SULFATE 5; 5; 5; 5 MG/1; MG/1; MG/1; MG/1
20 TABLET ORAL 2 TIMES DAILY
Qty: 60 TABLET | Refills: 0 | Status: SHIPPED | OUTPATIENT
Start: 2021-01-01 | End: 2021-01-01

## 2021-01-01 RX ORDER — AMLODIPINE BESYLATE 5 MG/1
5 TABLET ORAL DAILY
Qty: 30 TABLET | Refills: 5 | Status: SHIPPED | OUTPATIENT
Start: 2021-01-01 | End: 2022-10-20

## 2021-01-01 RX ORDER — HYDROCHLOROTHIAZIDE 25 MG/1
TABLET ORAL
Qty: 30 TABLET | Refills: 5 | Status: SHIPPED | OUTPATIENT
Start: 2021-01-01 | End: 2021-01-01

## 2021-01-01 RX ORDER — DEXTROAMPHETAMINE SACCHARATE, AMPHETAMINE ASPARTATE, DEXTROAMPHETAMINE SULFATE AND AMPHETAMINE SULFATE 5; 5; 5; 5 MG/1; MG/1; MG/1; MG/1
20 TABLET ORAL 2 TIMES DAILY
Qty: 30 TABLET | Refills: 0 | Status: SHIPPED | OUTPATIENT
Start: 2021-01-01 | End: 2021-01-01 | Stop reason: SDUPTHER

## 2021-01-01 RX ORDER — ROSUVASTATIN CALCIUM 10 MG/1
10 TABLET, COATED ORAL NIGHTLY
Qty: 30 TABLET | Refills: 3 | Status: SHIPPED | OUTPATIENT
Start: 2021-01-01

## 2021-01-01 RX ORDER — CARVEDILOL 25 MG/1
25 TABLET ORAL 2 TIMES DAILY
Qty: 60 TABLET | Refills: 5 | Status: SHIPPED | OUTPATIENT
Start: 2021-01-01 | End: 2022-10-20

## 2021-01-01 RX ORDER — AZELASTINE 1 MG/ML
2 SPRAY, METERED NASAL 2 TIMES DAILY
Qty: 30 ML | Refills: 2 | Status: SHIPPED | OUTPATIENT
Start: 2021-01-01 | End: 2021-01-01 | Stop reason: SDUPTHER

## 2021-01-01 RX ORDER — ALBUTEROL SULFATE 90 UG/1
2 AEROSOL, METERED RESPIRATORY (INHALATION) EVERY 4 HOURS PRN
Qty: 1 INHALER | Refills: 2 | Status: SHIPPED | OUTPATIENT
Start: 2021-01-01 | End: 2022-06-09

## 2021-01-01 ASSESSMENT — ENCOUNTER SYMPTOMS
DIARRHEA: 0
DIARRHEA: 0
BLOOD IN STOOL: 0
WHEEZING: 0
VOICE CHANGE: 0
CHEST TIGHTNESS: 0
COLOR CHANGE: 0
COUGH: 1
WHEEZING: 0
CHEST TIGHTNESS: 0
EYE DISCHARGE: 0
VOMITING: 0
ABDOMINAL PAIN: 0
EYE PAIN: 0
EYE DISCHARGE: 0
VOICE CHANGE: 0
WHEEZING: 0
SHORTNESS OF BREATH: 0
ABDOMINAL PAIN: 0
COLOR CHANGE: 0
VOICE CHANGE: 0
STRIDOR: 0
CONSTIPATION: 0
DIARRHEA: 0
SHORTNESS OF BREATH: 0
SORE THROAT: 0
SORE THROAT: 0
BLOOD IN STOOL: 0
EYE DISCHARGE: 0
PHOTOPHOBIA: 0
BLOOD IN STOOL: 0
EYE DISCHARGE: 0
PHOTOPHOBIA: 0
RHINORRHEA: 0
STRIDOR: 0
COLOR CHANGE: 0
CHEST TIGHTNESS: 0
ABDOMINAL PAIN: 0
WHEEZING: 0
SORE THROAT: 0
RHINORRHEA: 0
VOMITING: 0
RHINORRHEA: 0
EYE PAIN: 0
EYE PAIN: 0
SHORTNESS OF BREATH: 0
CONSTIPATION: 0
SORE THROAT: 0
COUGH: 1
COUGH: 1
ABDOMINAL PAIN: 0
BLOOD IN STOOL: 0
VOMITING: 0
RHINORRHEA: 0
STRIDOR: 0
COUGH: 1
EYE REDNESS: 0
EYE REDNESS: 0
CONSTIPATION: 0
VOMITING: 0
DIARRHEA: 0
EYE PAIN: 0
EYE REDNESS: 0
CONSTIPATION: 0
STRIDOR: 0
PHOTOPHOBIA: 0
SHORTNESS OF BREATH: 0
PHOTOPHOBIA: 0
COLOR CHANGE: 0
EYE REDNESS: 0
CHEST TIGHTNESS: 0
VOICE CHANGE: 0

## 2021-01-01 ASSESSMENT — PATIENT HEALTH QUESTIONNAIRE - PHQ9
SUM OF ALL RESPONSES TO PHQ QUESTIONS 1-9: 0
SUM OF ALL RESPONSES TO PHQ QUESTIONS 1-9: 0
1. LITTLE INTEREST OR PLEASURE IN DOING THINGS: 0
2. FEELING DOWN, DEPRESSED OR HOPELESS: 0

## 2021-01-04 NOTE — PROGRESS NOTES
Jaime Parra is here for evaluation for ADHD.   male is not in school    DSM-IV Diagnostic Criteria for ADHD    Rating scale (Rare- 0, Occasional - 1, Frequent - 2)    Inattention:   · Fails to give close attention to details or makes careless mistakes in schoolwork, work, or other activities: 0  · Has difficulty sustaining attention is tasks or play activities:  0  · Does not seem to listen when spoken to directly:  0  · Does not follow through on instructions and fails to finish schoolwork, chores, or duties(not due to oppositional behavior or failure to understand instr): 0  · Difficulty organizing tasks and activities:  0  · Avoids, dislikes or is reluctant to engage in tasks that require sustained mental effort: 0  · Loses things necessary for tasks or activities:   0  · Easily distracted by extraneous stimuli:  0  · Forgetful in daily activities:  0    InattentionTotal (questions with score of 1 or 2) (0/9):   Total points:0    Hyperactivity:  · Fidgets with hands or feet and squirms in seat:  0  · Leaves seat in classroom or in other situations in which remaining seated is expected :  0  · Runs about or climbs excessively in situations in which it is inappropriate of feelings of restlessness:  0  · Often has difficulty playing or engaging in leisure activities quietly:  0  · \"On the go\" or acts as if \"drivern by a motor\":  0  · Talks excessively:  0    Impulsivity:  · Blurts out answers before questions have been completed:  0  · Difficulty awaiting turn:  0  · Interrupts or intrudes on others:  0    Hyperactive/ImpulsivityTotal (questions with score of 1 or 2) (0/9):  Total points:0    · Some symptoms were present before 8years of age Yes  · Symptoms present for at least 6 months Yes  · Social impairment present in two or more setting    [facility] No   Other  No    · Clinically significant impairment in functioning   Social No   Academic NA    Occupational No Have you seen any other physician or provider since your last visit no    Have you had any other diagnostic tests since your last visit? no    Have you changed or stopped any medications since your last visit? no     Are you taking any over the counter medications, herbals or vitamins? No   If yes, see medication list.    Are you taking all your prescribed medications? yes  If NO, why?        How confident are you that your childs ADHD is manageable? 10    Patient Self-Management Goal for ADHA  Personal Goal: To control Symptoms, patient would like to change medications due to cost  Barriers to success: none  Plan for overcoming my barriers: None     Confidence of achieving goal: 10/10  Date goal set: 1/4/21  Date goal to be attained: 1 week

## 2021-03-04 PROBLEM — R73.01 IMPAIRED FASTING GLUCOSE: Status: ACTIVE | Noted: 2021-01-01

## 2021-03-04 PROBLEM — J41.0 SIMPLE CHRONIC BRONCHITIS (HCC): Status: ACTIVE | Noted: 2021-01-01

## 2021-03-04 PROBLEM — F50.81 BINGE EATING DISORDER: Status: ACTIVE | Noted: 2021-01-01

## 2021-03-04 PROBLEM — F90.9 ADULT ADHD: Status: ACTIVE | Noted: 2021-01-01

## 2021-03-04 NOTE — PROGRESS NOTES
Johanna Thomas (:  1975) is a 54 y.o. male,Established patient, here for evaluation of the following chief complaint(s):  ADHD and Hypertension      ASSESSMENT/PLAN:    1. Adult ADHD  -     amphetamine-dextroamphetamine (ADDERALL XR) 15 MG extended release capsule; Take 1 capsule by mouth daily for 30 days. , Disp-30 capsule, R-0Normal  2. Binge eating disorder  3. Essential hypertension  4. Simple chronic bronchitis (HCC)  -     albuterol sulfate HFA (VENTOLIN HFA) 108 (90 Base) MCG/ACT inhaler; Inhale 2 puffs into the lungs every 4 hours as needed for Wheezing, Disp-1 Inhaler, R-1Please dispense ventolin if coveredNormal  5. Tobacco use  6. Hyperlipidemia, unspecified hyperlipidemia type  7. Impaired fasting glucose  8. Pre-diabetes      Proceed with trial of Adderall XR 15 mg once daily  Strict daily schedule recommended  Attempt to control binge eating disorder symptoms with good self-control  Daily exercise and healthy diet encouraged  Good sleep hygiene recommended  Refill albuterol to use as needed  Smoking cessation encouraged  Call with concerns      Return in about 2 months (around 2021) for routine follow up. SUBJECTIVE/OBJECTIVE:    HPI    Patient presents today for routine follow-up of ADHD and binge eating disorder. He was previously treated with Vyvanse which he took her for 1 month and had significant improvement in symptoms however this was not covered under his insurance. We did try to get this covered and apparently they did take $55 off of his prescription cost but it still cost him $350 per month. He is not able to afford this for medication. He would like to discuss different medication. I did tell him that there are many different options for ADHD but unfortunately Vyvanse is the only medication approved for binge eating disorder.   We also discussed the fact that the stimulant medications do have a side effect of decreased appetite and he may be able to control his binge eating symptoms with the ADHD medication. He does have a history of hypertension and his blood pressure has been well controlled with his current medications. There has not been a significant increase in blood pressure with treatment of the stimulant. He does understand that he will need to monitor his blood pressure closely while taking Adderall. He does have a history of chronic bronchitis secondary to tobacco use and does request a refill of the albuterol inhaler that he uses as needed. He does have an order for blood work that he is supposed to have done for evaluation of his chronic medical problems which include hypertension, hyperlipidemia and impaired fasting blood sugar consistent with prediabetes. He was once again encouraged to get this done  He has no other concerns at this time. cmw      Denver Lofton is here for evaluation for ADHD.   male is not in school    DSM-IV Diagnostic Criteria for ADHD    Rating scale (Rare- 0, Occasional - 1, Frequent - 2)    Inattention:   · Fails to give close attention to details or makes careless mistakes in schoolwork, work, or other activities: 1  · Has difficulty sustaining attention in tasks or play activities:  2  · Does not seem to listen when spoken to directly:  2  · Does not follow through on instructions and fails to finish schoolwork, chores, or duties(not due to oppositional behavior or failure to understand instr): 0  · Difficulty organizing tasks and activities:  0  · Avoids, dislikes or is reluctant to engage in tasks that require sustained mental effort: 1  · Loses things necessary for tasks or activities:   0  · Easily distracted by extraneous stimuli:  2  · Forgetful in daily activities:  1    InattentionTotal (questions with score of 1 or 2) (6/9):   Total points:8    Hyperactivity:  · Fidgets with hands or feet and squirms in seat:  2  · Leaves seat in classroom or in other situations in which remaining seated is expected :  0  · Runs about or monitoring  12/12/2020       Medical history Review  Past Medical, Family, and Social History reviewed and does contribute to the patient presenting condition      Review of Systems   Constitutional: Negative for appetite change, fatigue, fever and unexpected weight change. HENT: Positive for congestion (chronic). Negative for postnasal drip, rhinorrhea, sore throat, tinnitus and voice change. Left nostril congestion still but much better / right nostril is great   Eyes: Negative for photophobia, pain, discharge, redness and visual disturbance. Respiratory: Positive for cough (occasional secondary to smoking). Negative for chest tightness, shortness of breath, wheezing and stridor. Cardiovascular: Negative for chest pain, palpitations and leg swelling. Gastrointestinal: Negative for abdominal pain, blood in stool, constipation, diarrhea and vomiting. Endocrine: Negative for polydipsia, polyphagia and polyuria. Genitourinary: Negative for difficulty urinating, frequency, hematuria and urgency. Musculoskeletal: Negative for arthralgias, myalgias and neck pain. Skin: Negative for color change and rash. Allergic/Immunologic: Negative for immunocompromised state. Neurological: Negative for dizziness, tremors, weakness, light-headedness, numbness and headaches. Hematological: Negative for adenopathy. Does not bruise/bleed easily. Psychiatric/Behavioral: Positive for decreased concentration (much improved with vyvanse but he cannot afford this). Negative for agitation, dysphoric mood and sleep disturbance. The patient is hyperactive (much improved with vyvanse but he cannot afford this). The patient is not nervous/anxious. Physical Exam  Vitals signs and nursing note reviewed. Constitutional:       General: He is not in acute distress. Appearance: Normal appearance. He is well-developed. He is obese. He is not ill-appearing, toxic-appearing or diaphoretic.       Comments: Binge eating disorder much improved with vyvanse   HENT:      Head: Normocephalic. Right Ear: Tympanic membrane, ear canal and external ear normal. No middle ear effusion. There is no impacted cerumen. Left Ear: Tympanic membrane, ear canal and external ear normal.  No middle ear effusion. There is no impacted cerumen. Nose: Mucosal edema present. No congestion or rhinorrhea. Right Sinus: No maxillary sinus tenderness or frontal sinus tenderness. Left Sinus: No maxillary sinus tenderness or frontal sinus tenderness. Mouth/Throat:      Mouth: Mucous membranes are moist.      Pharynx: Uvula midline. No oropharyngeal exudate or posterior oropharyngeal erythema. Tonsils: No tonsillar exudate. Eyes:      General: No scleral icterus. Right eye: No discharge. Left eye: No discharge. Extraocular Movements: Extraocular movements intact. Conjunctiva/sclera: Conjunctivae normal.      Pupils: Pupils are equal, round, and reactive to light. Neck:      Musculoskeletal: Normal range of motion and neck supple. Vascular: No JVD. Cardiovascular:      Rate and Rhythm: Normal rate and regular rhythm. Pulses: Normal pulses. Heart sounds: Normal heart sounds. No murmur. Pulmonary:      Effort: Pulmonary effort is normal. No respiratory distress. Breath sounds: Normal breath sounds. No wheezing or rales. Abdominal:      General: Bowel sounds are normal.      Tenderness: There is no right CVA tenderness or left CVA tenderness. Musculoskeletal:      Right lower leg: No edema. Left lower leg: No edema. Lymphadenopathy:      Cervical: No cervical adenopathy. Skin:     General: Skin is warm. Capillary Refill: Capillary refill takes less than 2 seconds. Findings: No rash. Neurological:      General: No focal deficit present. Mental Status: He is alert and oriented to person, place, and time.    Psychiatric:         Mood and Affect: Mood and affect normal. Mood is not anxious or depressed. Speech: Speech normal.         Behavior: Behavior normal.         Thought Content: Thought content normal.         Cognition and Memory: Cognition normal.         Judgment: Judgment normal.      Comments: An electronic signature was used to authenticate this note.     --Sharad Doshi MD

## 2021-03-18 NOTE — TELEPHONE ENCOUNTER
Last visit: 3/4/21  Last Med refill: 9/25/20    Next Visit Date:  Future Appointments   Date Time Provider [place] Spring   5/12/2021 10:40 AM Melvin Bamberger, MD [facility] [address] Maintenance   Topic Date Due    A1C test (Diabetic or Prediabetic)  09/23/2020    Potassium monitoring  12/12/2020    Creatinine monitoring  12/12/2020    Flu vaccine (1) 12/02/2021 (Originally 9/1/2020)    Pneumococcal 0-64 years Vaccine (1 of 1 - PPSV23) 12/02/2021 (Originally 11/8/1980)    DTaP/Tdap/Td vaccine (2 - Td) 02/13/2022    Lipid screen  09/23/2024    Hepatitis A vaccine  Aged Out    Hepatitis B vaccine  Aged Out    Hib vaccine  Aged Out    Meningococcal (ACWY) vaccine  Aged Out    Hepatitis C screen  Discontinued    HIV screen  Discontinued       Hemoglobin A1C (%)   Date Value   09/23/2019 5.7   05/02/2016 5.6             ( goal A1C is < 7)   No results found for: LABMICR  LDL Cholesterol (mg/dL)   Date Value   09/23/2019 119   05/02/2016 141 (H)       (goal LDL is <100)   AST (U/L)   Date Value   09/23/2019 13     ALT (U/L)   Date Value   09/23/2019 12     BUN (mg/dL)   Date Value   12/12/2019 14     BP Readings from Last 3 Encounters:   03/04/21 124/84   01/04/21 110/86   12/02/20 128/86          (goal 120/80)    All Future Testing planned in [facility]  Lab Frequency Next Occurrence               Patient Active Problem List:     Asthma     Allergic rhinitis     Hypertension     Hyperlipidemia     Metabolic syndrome     Pre-diabetes     Tobacco use     Simple chronic bronchitis (HCC)     Impaired fasting glucose     Binge eating disorder     Adult ADHD

## 2021-05-12 NOTE — PROGRESS NOTES
Crow Young (:  1975) is a 54 y.o. male,Established patient, here for evaluation of the following chief complaint(s):    ADHD      ASSESSMENT/PLAN:    1. Essential hypertension  2. Hyperlipidemia, unspecified hyperlipidemia type  3. Impaired fasting glucose  4. Pre-diabetes  5. Seasonal allergies  -     azelastine (ASTELIN) 0.1 % nasal spray; 2 sprays by Nasal route 2 times daily, Disp-30 mL, R-2Normal  6. Nasal congestion  7. History of nasal polyp  8. 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 Anxiety disorder due to medical condition  10. Adult ADHD  -     amphetamine-dextroamphetamine (ADDERALL, 20MG,) 20 MG tablet; Take 1 tablet by mouth 2 times daily for 60 days. , Disp-30 tablet, R-0Normal  11. Binge eating disorder  12. Sleep disturbance  13. Simple chronic bronchitis (HCC)  -     albuterol sulfate HFA (VENTOLIN HFA) 108 (90 Base) MCG/ACT inhaler; Inhale 2 puffs into the lungs every 4 hours as needed for Wheezing, Disp-1 Inhaler, R-1Normal  14. Tobacco use      Proceed with continue current medications  Obtain fasting labs as ordered  Healthy low-cholesterol, low carbohydrate diet and regular exercise encouraged  Weight reduction encouraged  Continue antihistamine and Astelin  Follow-up with ENT  Monitor mood for worsening  Change Adderall XR 15 mg to short acting Adderall 20 mg twice daily  Trial of melatonin at bedtime for sleep  May use Tylenol PM if melatonin ineffective  Consider sleep study in the fall if sleeping problems continue  Refill albuterol  Smoking cessation encouraged  Call with concerns      Return in about 3 months (around 2021) for routine follow up. SUBJECTIVE/OBJECTIVE:    HPI    Patient presents today for routine follow-up of his chronic medical problems which include hypertension, hyperlipidemia, impaired fasting blood sugar, prediabetes, seasonal allergies, nasal polyps and opacification of the maxillary sinuses.   He also has a history of depressive disorder due to medical condition as well as anxiety due to medical condition. He also has a history of ADHD and binge eating disorder. His blood pressure is currently well controlled with his current medications. He does have history of hyperlipidemia that is treated with diet and exercise. He is due for repeat labs to reevaluate this. He does have a history of an impaired fasting blood sugar and prediabetes. He is due for repeat labs to have this reevaluated but has not had these done. He does have a history of significant nasal polyps which caused chronic nasal congestion and maxillary sinus opacification. He did have surgery to remove these polyps and reports that his symptoms are better. He does us antihistamine and Astelin. He was encouraged to use these more regularly. The right side of his nostril is great but the left side of his nostril does seem to be congested and he feels as though the polyps are there. I did advise him to follow-up with ENT. He does have a history of depression anxiety that he suffered when he had severe nasal polyps and nasal congestion. This is improved since surgery. He does still get sad sometimes and some occasional anxiety but does not wish to take any medication at this time. He does have a history of ADHD and binge eating disorder. He was previously treated with vyvanse but this was not covered under his insurance and was too expensive. He is currently taking Adderall XR 15 mg once daily. He states that he does feel much better with taking medication and feels as though he is able to remain calm and calm his mind. He is able to stay focused and complete tasks. He does ask about short acting Adderall that he can take twice daily as this may be more affordable for him. I did advise him that we can try short acting Adderall twice daily to see if this will help to control his symptoms. Unfortunately has some difficulty with sleep.   He states that this was present before taking the medication but he wonders if he should try melatonin. I did advise him to try melatonin and he can also use Tylenol PM.  He does have a history of tobacco use and intermittent bronchitis. He does request a refill of albuterol to use as needed. monster      Portillo So is here for evaluation for ADHD.   male is not in school    DSM-IV Diagnostic Criteria for ADHD    Rating scale (Rare- 0, Occasional - 1, Frequent - 2)    Inattention:   · Fails to give close attention to details or makes careless mistakes in schoolwork, work, or other activities: 1  · Has difficulty sustaining attention in tasks or play activities:  1  · Does not seem to listen when spoken to directly:  2  · Does not follow through on instructions and fails to finish schoolwork, chores, or duties(not due to oppositional behavior or failure to understand instr): 1  · Difficulty organizing tasks and activities:  1  · Avoids, dislikes or is reluctant to engage in tasks that require sustained mental effort: 1  · Loses things necessary for tasks or activities:   2  · Easily distracted by extraneous stimuli:  2  · Forgetful in daily activities:  1    InattentionTotal (questions with score of 1 or 2) (9/9):   Total points:12    Hyperactivity:  · Fidgets with hands or feet and squirms in seat:  2  · Leaves seat in classroom or in other situations in which remaining seated is expected :  0  · Runs about or climbs excessively in situations in which it is inappropriate of feelings of restlessness:  0  · Often has difficulty playing or engaging in leisure activities quietly:  0  · \"On the go\" or acts as if \"drivern by a motor\":  1  · Talks excessively:  2    Impulsivity:  · Blurts out answers before questions have been completed:  2  · Difficulty awaiting turn:  1  · Interrupts or intrudes on others:  2    Hyperactive/ImpulsivityTotal (questions with score of 1 or 2) (6/9):  Total points:10    · Some symptoms were present before 7 years of cobblestonning) present. No oropharyngeal exudate. Tonsils: No tonsillar exudate. Eyes:      General: No scleral icterus. Right eye: No discharge. Left eye: No discharge. Extraocular Movements: Extraocular movements intact. Conjunctiva/sclera: Conjunctivae normal.      Pupils: Pupils are equal, round, and reactive to light. Neck:      Musculoskeletal: Normal range of motion and neck supple. Vascular: No JVD. Cardiovascular:      Rate and Rhythm: Normal rate and regular rhythm. Pulses: Normal pulses. Heart sounds: Normal heart sounds. No murmur. Pulmonary:      Effort: Pulmonary effort is normal. No respiratory distress. Breath sounds: Normal breath sounds. No wheezing or rales. Chest:      Comments: hacky cough occasionally  Abdominal:      General: Bowel sounds are normal.      Tenderness: There is no right CVA tenderness or left CVA tenderness. Musculoskeletal:      Right lower leg: No edema. Left lower leg: No edema. Lymphadenopathy:      Cervical: No cervical adenopathy. Skin:     General: Skin is warm. Capillary Refill: Capillary refill takes less than 2 seconds. Findings: No rash. Neurological:      General: No focal deficit present. Mental Status: He is alert and oriented to person, place, and time. Psychiatric:         Mood and Affect: Mood and affect normal. Mood is not anxious or depressed. Speech: Speech normal.         Behavior: Behavior normal.         Thought Content: Thought content normal.         Cognition and Memory: Cognition normal.         Judgment: Judgment normal.           An electronic signature was used to authenticate this note.     --Cecilio Skinner MD

## 2021-05-13 PROBLEM — J30.2 SEASONAL ALLERGIES: Status: ACTIVE | Noted: 2021-01-01

## 2021-05-13 PROBLEM — G47.9 SLEEP DISTURBANCE: Status: ACTIVE | Noted: 2021-01-01

## 2021-05-13 PROBLEM — F06.4 ANXIETY DISORDER DUE TO MEDICAL CONDITION: Status: ACTIVE | Noted: 2021-01-01

## 2021-05-13 PROBLEM — F06.30 DEPRESSIVE DISORDER DUE TO SEPARATE MEDICAL CONDITION: Status: ACTIVE | Noted: 2021-01-01

## 2021-06-09 NOTE — TELEPHONE ENCOUNTER
LOV: 05/12/2021    LRF: 05/12/2021    Health Maintenance   Topic Date Due    COVID-19 Vaccine (1) Never done    A1C test (Diabetic or Prediabetic)  09/23/2020    Potassium monitoring  12/12/2020    Creatinine monitoring  12/12/2020    Flu vaccine (Season Ended) 12/02/2021 (Originally 9/1/2021)    Pneumococcal 0-64 years Vaccine (1 of 2 - PPSV23) 12/02/2021 (Originally 11/8/1980)    DTaP/Tdap/Td vaccine (2 - Td or Tdap) 02/13/2022    Lipid screen  09/23/2024    Hepatitis A vaccine  Aged Out    Hepatitis B vaccine  Aged Out    Hib vaccine  Aged Out    Meningococcal (ACWY) vaccine  Aged Out    Hepatitis C screen  Discontinued    HIV screen  Discontinued             (applicable per patient's age: Cancer Screenings, Depression Screening, Fall Risk Screening, Immunizations)    Hemoglobin A1C (%)   Date Value   09/23/2019 5.7   05/02/2016 5.6     LDL Cholesterol (mg/dL)   Date Value   09/23/2019 119     AST (U/L)   Date Value   09/23/2019 13     ALT (U/L)   Date Value   09/23/2019 12     BUN (mg/dL)   Date Value   12/12/2019 14      (goal A1C is < 7)   (goal LDL is <100) need 30-50% reduction from baseline     BP Readings from Last 3 Encounters:   05/12/21 124/72   03/04/21 124/84   01/04/21 110/86    (goal /80)      All Future Testing planned in [facility]:  Lab Frequency Next Occurrence       Next Visit Date:  Future Appointments   Date Time Provider [facility] Spring   9/8/2021  8:20 AM Noé Worrell MD [facility]            Patient Active Problem List:     Asthma     Allergic rhinitis     Hypertension     Hyperlipidemia     Metabolic syndrome     Pre-diabetes     Tobacco use     Simple chronic bronchitis (HCC)     Impaired fasting glucose     Binge eating disorder     Adult ADHD     Seasonal allergies     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

## 2021-07-07 NOTE — TELEPHONE ENCOUNTER
Last visit: 5/12/21  Last Med refill: 6/9/21  Does patient have enough medication for 72 hours: Yes    Next Visit Date:  Future Appointments   Date Time Provider Bonifacio Spring   9/8/2021  8:20 AM Stephanie Hurtado MD [facility] [address] Maintenance   Topic Date Due    COVID-19 Vaccine (1) Never done    Colon cancer screen colonoscopy  Never done    A1C test (Diabetic or Prediabetic)  09/23/2020    Potassium monitoring  12/12/2020    Creatinine monitoring  12/12/2020    Pneumococcal 0-64 years Vaccine (1 of 2 - PPSV23) 12/02/2021 (Originally 11/8/1980)    Flu vaccine (1) 09/01/2021    DTaP/Tdap/Td vaccine (2 - Td or Tdap) 02/13/2022    Lipid screen  09/23/2024    Hepatitis A vaccine  Aged Out    Hepatitis B vaccine  Aged Out    Hib vaccine  Aged Out    Meningococcal (ACWY) vaccine  Aged Out    Hepatitis C screen  Discontinued    HIV screen  Discontinued       Hemoglobin A1C (%)   Date Value   09/23/2019 5.7   05/02/2016 5.6             ( goal A1C is < 7)   No results found for: LABMICR  LDL Cholesterol (mg/dL)   Date Value   09/23/2019 119   05/02/2016 141 (H)       (goal LDL is <100)   AST (U/L)   Date Value   09/23/2019 13     ALT (U/L)   Date Value   09/23/2019 12     BUN (mg/dL)   Date Value   12/12/2019 14     BP Readings from Last 3 Encounters:   05/12/21 124/72   03/04/21 124/84   01/04/21 110/86          (goal 120/80)    All Future Testing planned in [facility]  Lab Frequency Next Occurrence               Patient Active Problem List:     Asthma     Allergic rhinitis     Hypertension     Hyperlipidemia     Metabolic syndrome     Pre-diabetes     Tobacco use     Simple chronic bronchitis (HCC)     Impaired fasting glucose     Binge eating disorder     Adult ADHD     Seasonal allergies     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

## 2021-08-03 NOTE — TELEPHONE ENCOUNTER
Last visit: 05/12/21  Last Med refill: 07/07/21  Does patient have enough medication for 72 hours: Yes.  Pharmacy verified    Next Visit Date:  Future Appointments   Date Time Provider Bonifacio Spring   9/8/2021  8:20 AM MD Jerry Orellana [facility] [address] Maintenance   Topic Date Due    COVID-19 Vaccine (1) Never done    Colon cancer screen colonoscopy  Never done    A1C test (Diabetic or Prediabetic)  09/23/2020    Potassium monitoring  12/12/2020    Creatinine monitoring  12/12/2020    Pneumococcal 0-64 years Vaccine (1 of 2 - PPSV23) 12/02/2021 (Originally [birth year]/8/1980)    Flu vaccine (1) 09/01/2021    DTaP/Tdap/Td vaccine (2 - Td or Tdap) 02/13/2022    Lipid screen  09/23/2024    Hepatitis A vaccine  Aged Out    Hepatitis B vaccine  Aged Out    Hib vaccine  Aged Out    Meningococcal (ACWY) vaccine  Aged Out    Hepatitis C screen  Discontinued    HIV screen  Discontinued       Hemoglobin A1C (%)   Date Value   09/23/2019 5.7   05/02/2016 5.6             ( goal A1C is < 7)   No results found for: LABMICR  LDL Cholesterol (mg/dL)   Date Value   09/23/2019 119   05/02/2016 141 (H)       (goal LDL is <100)   AST (U/L)   Date Value   09/23/2019 13     ALT (U/L)   Date Value   09/23/2019 12     BUN (mg/dL)   Date Value   12/12/2019 14     BP Readings from Last 3 Encounters:   05/12/21 124/72   03/04/21 124/84   01/04/21 110/86          (goal 120/80)    All Future Testing planned in [facility]  Lab Frequency Next Occurrence               Patient Active Problem List:     Asthma     Allergic rhinitis     Hypertension     Hyperlipidemia     Metabolic syndrome     Pre-diabetes     Tobacco use     Simple chronic bronchitis (HCC)     Impaired fasting glucose     Binge eating disorder     Adult ADHD     Seasonal allergies     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

## 2021-09-08 NOTE — PROGRESS NOTES
Smitha Silva (:  1975) is a 54 y.o. male,Established patient, here for evaluation of the following chief complaint(s):  ADHD and Hypertension         ASSESSMENT/PLAN:    1. Essential hypertension  -     Comprehensive Metabolic Panel; Future  -     CBC; Future  2. Hyperlipidemia, unspecified hyperlipidemia type  -     Lipid Panel; Future  -     CBC; Future  3. Impaired fasting glucose  -     Hemoglobin A1C; Future  4. Pre-diabetes  -     Hemoglobin A1C; Future  5. Seasonal allergies  -     azelastine (ASTELIN) 0.1 % nasal spray; 2 sprays by Nasal route 2 times daily, Disp-30 mL, R-5Normal  6. Nasal congestion  -     azelastine (ASTELIN) 0.1 % nasal spray; 2 sprays by Nasal route 2 times daily, Disp-30 mL, R-5Normal  7. History of nasal polyp  -     azelastine (ASTELIN) 0.1 % nasal spray; 2 sprays by Nasal route 2 times daily, Disp-30 mL, R-5Normal  8. 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 Anxiety disorder due to medical condition  10. Adult ADHD  -     amphetamine-dextroamphetamine (ADDERALL, 20MG,) 20 MG tablet; Take 1 tablet by mouth 2 times daily for 30 days. , Disp-60 tablet, R-0Normal  11. Binge eating disorder  12. Sleep disturbance  13. Simple chronic bronchitis ([address]  14. Tobacco use  15. Difficulty urinating  -     PSA, Diagnostic; Future  -     Urinalysis Reflex to Culture; Future  16.  Urinary frequency      Proceed with continue current medications  Obtain fasting labs as ordered  Healthy low-cholesterol, low carbohydrate diet and regular exercise encouraged  Weight reduction encouraged  Continue antihistamine and Astelin nasal spray and use regularly  Follow-up with ENT  Monitor mood for significant change  Continue Adderall 20 mg twice daily  Good sleep hygiene recommended  Consider sleep study in the future as previously discussed  Albuterol as needed  Smoking cessation encouraged  Obtain PSA and urinalysis with reflex culture  Consider urology consult if symptoms persist  Covid vaccine recommended  CRC screening recommended -patient will find out what is covered under his insurance  Call with concerns      Return in about 3 months (around 12/8/2021) for routine follow up. Subjective       Akira Barros is here for evaluation for ADHD.   male is not in school    DSM-IV Diagnostic Criteria for ADHD    Rating scale (Rare- 0, Occasional - 1, Frequent - 2)    Inattention:   · Fails to give close attention to details or makes careless mistakes in schoolwork, work, or other activities: 1  · Has difficulty sustaining attention in tasks or play activities:  0  · Does not seem to listen when spoken to directly:  1  · Does not follow through on instructions and fails to finish schoolwork, chores, or duties(not due to oppositional behavior or failure to understand instr): 1  · Difficulty organizing tasks and activities:  0  · Avoids, dislikes or is reluctant to engage in tasks that require sustained mental effort: 0  · Loses things necessary for tasks or activities:   1  · Easily distracted by extraneous stimuli:  1  · Forgetful in daily activities:  0    InattentionTotal (questions with score of 1 or 2) (5/9):   Total points:5    Hyperactivity:  · Fidgets with hands or feet and squirms in seat:  1  · Leaves seat in classroom or in other situations in which remaining seated is expected :  0  · Runs about or climbs excessively in situations in which it is inappropriate of feelings of restlessness:  0  · Often has difficulty playing or engaging in leisure activities quietly:  0  · \"On the go\" or acts as if \"drivern by a motor\":  1  · Talks excessively:  2    Impulsivity:  · Blurts out answers before questions have been completed:  2  · Difficulty awaiting turn:  0  · Interrupts or intrudes on others:  1    Hyperactive/ImpulsivityTotal (questions with score of 1 or 2) (5/9):  Total points:7    · Some symptoms were present before 8years of age No  · Symptoms present for at least 6 months Yes  · Social impairment present in two or more setting    [facility] No   Other  No    · Clinically significant impairment in functioning   Social No   Academic NA    Occupational No    Have you seen any other physician or provider since your last visit no    Have you had any other diagnostic tests since your last visit? no    Have you changed or stopped any medications since your last visit including any over-the-counter medicines, vitamins, or herbal medicines? no     Are you taking all your prescribed medications? Yes  If NO, why? -  N/A           Patient Self-Management Goal for this visit. What is your goal for your visit today? - Evaluate & treat   Barriers to success: none   Plan for overcoming my barriers: N/A      Confidence: 10/10   Date goal set: 9/8/21   Date expected to reach goal: 1day    Medical history Review  Past Medical, Family, and Social History reviewed and does contribute to the patient presenting condition    Health Maintenance Due   Topic Date Due    COVID-19 Vaccine (1) Never done    Colon cancer screen colonoscopy  Never done    A1C test (Diabetic or Prediabetic)  09/23/2020    Potassium monitoring  12/12/2020    Creatinine monitoring  12/12/2020         All Future Testing planned in [facility]  Lab Frequency Next Occurrence         Health Maintenance Due   Topic Date Due    COVID-19 Vaccine (1) Never done    Colon cancer screen colonoscopy  Never done    A1C test (Diabetic or Prediabetic)  09/23/2020    Potassium monitoring  12/12/2020    Creatinine monitoring  12/12/2020       Medical history Review  Past Medical, Family, and Social History reviewed and does contribute to the patient presenting condition      HPI notes    Patient presents today for routine follow-up of his chronic medical problems which include hypertension, hyperlipidemia, impaired fasting blood sugar, prediabetes, seasonal allergies, nasal polyps and opacification of the maxillary sinuses.   He also has a history of depressive disorder due to medical condition as well as anxiety due to medical condition. He does have ADHD and binge eating disorder. His blood pressure is well controlled with his current medications. He does have a history of hyperlipidemia that is treated with diet and exercise. He is due for repeat labs to reevaluate this. He does have impaired fasting blood sugar consistent with prediabetes. He is due for repeat labs  He does have a history of significant nasal polyps which caused chronic nasal congestion and maxillary sinus opacification. He did have surgery to remove these polyps and reports that his symptoms are better. He does use an antihistamine and Astelin as needed. He was encouraged to use these more regularly. His right nostril is great but the left side does still get very congested. He was once again advised to follow-up with ENT regarding this. He does have a history of depression and anxiety that he suffered when he had severe nasal polyps and congestion. This is improved since his surgery. He does have a history of ADHD and binge eating disorder. This was previously treated with Vyvanse but unfortunately his insurance would not cover it. He was then changed to Adderall 20 mg twice daily to control his ADHD. He states that this is controlling his symptoms very well. He is able to stay focused and complete tasks. He does have a slight decreased appetite. He has lost 20 pounds since his last visit but he does work on [employer] in the summer is quite active. He usually will lose weight over the summer. He does have a history of sleep disturbance that is stable. He is not too concerned with this. He does have a history of intermittent bronchitis and tobacco use. He was encouraged to stop smoking. He does have albuterol that he will use as needed for bronchitis symptoms.   He does report some occasional urinary frequency and he feels as though his prostate shifts after he has intercourse with his wife. He states that he sometimes have difficulty urinating after intercourse and for several hours afterwards. He denies any problems during the day or at nighttime with this though. He has no other concerns at this time. cmw      Review of Systems   Constitutional: Negative for appetite change, fatigue, fever and unexpected weight change. HENT: Positive for congestion. Negative for postnasal drip, rhinorrhea, sore throat, tinnitus and voice change. Left nostril congestion still but much better / right nostril is great   Eyes: Negative for photophobia, pain, discharge, redness and visual disturbance. Respiratory: Positive for cough (occasional secondary to smoking). Negative for chest tightness, shortness of breath, wheezing and stridor. Cardiovascular: Negative for chest pain, palpitations and leg swelling. Gastrointestinal: Negative for abdominal pain, blood in stool, constipation, diarrhea and vomiting. Endocrine: Negative for polydipsia, polyphagia and polyuria. Genitourinary: Positive for difficulty urinating and frequency. Negative for hematuria and urgency. Musculoskeletal: Negative for arthralgias, myalgias and neck pain. Skin: Negative for color change and rash. Allergic/Immunologic: Negative for immunocompromised state. Neurological: Negative for dizziness, tremors, weakness, light-headedness, numbness and headaches. Hematological: Negative for adenopathy. Does not bruise/bleed easily. Psychiatric/Behavioral: Positive for decreased concentration and sleep disturbance. Negative for agitation and dysphoric mood. The patient is hyperactive. The patient is not nervous/anxious. Objective      Physical Exam  Vitals and nursing note reviewed. Constitutional:       General: He is not in acute distress. Appearance: Normal appearance. He is well-developed. He is obese.  He is not ill-appearing, toxic-appearing or diaphoretic. HENT:      Head: Normocephalic. Right Ear: Tympanic membrane, ear canal and external ear normal. No middle ear effusion. There is no impacted cerumen. Left Ear: Tympanic membrane, ear canal and external ear normal.  No middle ear effusion. There is no impacted cerumen. Nose: Mucosal edema present. No congestion or rhinorrhea. Right Sinus: No maxillary sinus tenderness or frontal sinus tenderness. Left Sinus: No maxillary sinus tenderness or frontal sinus tenderness. Mouth/Throat:      Mouth: Mucous membranes are moist.      Pharynx: Uvula midline. Posterior oropharyngeal erythema (mild with cobblestonning) present. No oropharyngeal exudate. Tonsils: No tonsillar exudate. Eyes:      General: No scleral icterus. Right eye: No discharge. Left eye: No discharge. Extraocular Movements: Extraocular movements intact. Conjunctiva/sclera: Conjunctivae normal.      Pupils: Pupils are equal, round, and reactive to light. Neck:      Vascular: No JVD. Cardiovascular:      Rate and Rhythm: Normal rate and regular rhythm. Pulses: Normal pulses. Heart sounds: Normal heart sounds. No murmur heard. Pulmonary:      Effort: Pulmonary effort is normal. No respiratory distress. Breath sounds: Normal breath sounds. No stridor. No wheezing, rhonchi or rales. Abdominal:      General: Bowel sounds are normal.      Tenderness: There is no right CVA tenderness or left CVA tenderness. Musculoskeletal:      Cervical back: Normal range of motion and neck supple. Right lower leg: No edema. Left lower leg: No edema. Lymphadenopathy:      Cervical: No cervical adenopathy. Skin:     General: Skin is warm. Capillary Refill: Capillary refill takes less than 2 seconds. Findings: No rash. Neurological:      General: No focal deficit present. Mental Status: He is alert and oriented to person, place, and time. Psychiatric:         Mood and Affect: Mood and affect normal. Mood is not anxious or depressed. Speech: Speech normal.         Behavior: Behavior normal.         Thought Content: Thought content normal.         Cognition and Memory: Cognition normal.         Judgment: Judgment normal.            An electronic signature was used to authenticate this note.     --Taylor Rowley MD

## 2021-10-19 NOTE — TELEPHONE ENCOUNTER
...  Last visit:9/8/2021Last Med refill:9/8/2021Does patient have enough medication for 72 hours: No    Next Visit Date:  Future Appointments   Date Time Provider Bonifacio Spring   1/3/2022 10:00 AM MD Rocky Simms Replaced by [facility] [address] Maintenance   Topic Date Due    COVID-19 Vaccine (1) Never done    Colon cancer screen colonoscopy  Never done    Pneumococcal 0-64 years Vaccine (1 of 2 - PPSV23) 12/02/2021 (Originally [birth year]/8/1980)    Flu vaccine (1) 09/08/2022 (Originally 9/1/2021)    DTaP/Tdap/Td vaccine (2 - Td or Tdap) 02/13/2022    A1C test (Diabetic or Prediabetic)  09/10/2022    Lipid screen  09/10/2022    Potassium monitoring  09/10/2022    Creatinine monitoring  09/10/2022    Hepatitis A vaccine  Aged Out    Hepatitis B vaccine  Aged Out    Hib vaccine  Aged Out    Meningococcal (ACWY) vaccine  Aged Out    Hepatitis C screen  Discontinued    HIV screen  Discontinued       Hemoglobin A1C (%)   Date Value   09/10/2021 5.9   09/23/2019 5.7   05/02/2016 5.6             ( goal A1C is < 7)   No results found for: LABMICR  LDL Cholesterol (mg/dL)   Date Value   09/10/2021 154 (H)   09/23/2019 119       (goal LDL is <100)   AST (U/L)   Date Value   09/10/2021 12     ALT (U/L)   Date Value   09/10/2021 13     BUN (mg/dL)   Date Value   09/10/2021 13     BP Readings from Last 3 Encounters:   09/08/21 120/80   05/12/21 124/72   03/04/21 124/84          (goal 120/80)    All Future Testing planned in [facility]  Lab Frequency Next Occurrence   Lipid, Fasting Once 12/10/2021   Comprehensive Metabolic Panel Once 92/49/9787   Hemoglobin A1C Once 12/10/2021               Patient Active Problem List:     Asthma     Allergic rhinitis     Hypertension     Hyperlipidemia     Metabolic syndrome     Pre-diabetes     Tobacco use     Simple chronic bronchitis (HCC)     Impaired fasting glucose     Binge eating disorder     Adult ADHD     Seasonal allergies     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

## 2021-10-20 NOTE — TELEPHONE ENCOUNTER
LOV 9-8-21  LRF 3-31-21    Health Maintenance   Topic Date Due    COVID-19 Vaccine (1) Never done    Colon cancer screen colonoscopy  Never done    Pneumococcal 0-64 years Vaccine (1 of 2 - PPSV23) 12/02/2021 (Originally [birth year]/8/1980)    Flu vaccine (1) 09/08/2022 (Originally 9/1/2021)    DTaP/Tdap/Td vaccine (2 - Td or Tdap) 02/13/2022    A1C test (Diabetic or Prediabetic)  09/10/2022    Lipid screen  09/10/2022    Potassium monitoring  09/10/2022    Creatinine monitoring  09/10/2022    Hepatitis A vaccine  Aged Out    Hepatitis B vaccine  Aged Out    Hib vaccine  Aged Out    Meningococcal (ACWY) vaccine  Aged Out    Hepatitis C screen  Discontinued    HIV screen  Discontinued             (applicable per patient's age: Cancer Screenings, Depression Screening, Fall Risk Screening, Immunizations)    Hemoglobin A1C (%)   Date Value   09/10/2021 5.9   09/23/2019 5.7   05/02/2016 5.6     LDL Cholesterol (mg/dL)   Date Value   09/10/2021 154 (H)     AST (U/L)   Date Value   09/10/2021 12     ALT (U/L)   Date Value   09/10/2021 13     BUN (mg/dL)   Date Value   09/10/2021 13      (goal A1C is < 7)   (goal LDL is <100) need 30-50% reduction from baseline     BP Readings from Last 3 Encounters:   09/08/21 120/80   05/12/21 124/72   03/04/21 124/84    (goal /80)      All Future Testing planned in [facility]:  Lab Frequency Next Occurrence   Lipid, Fasting Once 12/10/2021   Comprehensive Metabolic Panel Once 08/23/1790   Hemoglobin A1C Once 12/10/2021       Next Visit Date:  Future Appointments   Date Time Provider Bonifacio Spring   1/3/2022 10:00 AM MD Jerry [facility]            Patient Active Problem List:     Asthma     Allergic rhinitis     Hypertension     Hyperlipidemia     Metabolic syndrome     Pre-diabetes     Tobacco use     Simple chronic bronchitis (HCC)     Impaired fasting glucose     Binge eating disorder     Adult ADHD     Seasonal allergies     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

## 2021-10-25 NOTE — TELEPHONE ENCOUNTER
Patients wife called into the office today requesting a referral to a urologist for patient. Please advise if this is something that you are able to provide for him.

## 2021-10-25 NOTE — TELEPHONE ENCOUNTER
We need a reason for referral.  There are several symptoms in the last note - are these symptoms still persistent? Please get this information and referral for urology can be placed for me to approve.
